# Patient Record
Sex: MALE | Race: BLACK OR AFRICAN AMERICAN | Employment: OTHER | URBAN - METROPOLITAN AREA
[De-identification: names, ages, dates, MRNs, and addresses within clinical notes are randomized per-mention and may not be internally consistent; named-entity substitution may affect disease eponyms.]

---

## 2020-06-22 ENCOUNTER — APPOINTMENT (OUTPATIENT)
Dept: CT IMAGING | Age: 70
End: 2020-06-22
Attending: EMERGENCY MEDICINE
Payer: MEDICARE

## 2020-06-22 ENCOUNTER — HOSPITAL ENCOUNTER (EMERGENCY)
Age: 70
Discharge: HOME | End: 2020-06-22
Attending: EMERGENCY MEDICINE
Payer: MEDICARE

## 2020-06-22 ENCOUNTER — APPOINTMENT (OUTPATIENT)
Dept: ULTRASOUND IMAGING | Age: 70
End: 2020-06-22
Attending: EMERGENCY MEDICINE
Payer: MEDICARE

## 2020-06-22 VITALS
WEIGHT: 208.11 LBS | RESPIRATION RATE: 27 BRPM | HEIGHT: 68 IN | DIASTOLIC BLOOD PRESSURE: 92 MMHG | BODY MASS INDEX: 31.54 KG/M2 | TEMPERATURE: 99.8 F | HEART RATE: 85 BPM | SYSTOLIC BLOOD PRESSURE: 134 MMHG | OXYGEN SATURATION: 95 %

## 2020-06-22 DIAGNOSIS — R03.0 ELEVATED BLOOD PRESSURE READING: Primary | ICD-10-CM

## 2020-06-22 LAB
ALBUMIN SERPL-MCNC: 4 G/DL (ref 3.5–5)
ALBUMIN/GLOB SERPL: 0.9 {RATIO} (ref 1.1–2.2)
ALP SERPL-CCNC: 133 U/L (ref 45–117)
ALT SERPL-CCNC: 19 U/L (ref 12–78)
ANION GAP SERPL CALC-SCNC: 8 MMOL/L (ref 5–15)
AST SERPL-CCNC: 21 U/L (ref 15–37)
BASOPHILS # BLD: 0.1 K/UL (ref 0–0.1)
BASOPHILS NFR BLD: 1 % (ref 0–1)
BILIRUB SERPL-MCNC: 0.4 MG/DL (ref 0.2–1)
BNP SERPL-MCNC: 114 PG/ML
BUN SERPL-MCNC: 10 MG/DL (ref 6–20)
BUN/CREAT SERPL: 14 (ref 12–20)
CALCIUM SERPL-MCNC: 9.2 MG/DL (ref 8.5–10.1)
CHLORIDE SERPL-SCNC: 107 MMOL/L (ref 97–108)
CO2 SERPL-SCNC: 23 MMOL/L (ref 21–32)
COMMENT, HOLDF: NORMAL
CREAT SERPL-MCNC: 0.74 MG/DL (ref 0.7–1.3)
DIFFERENTIAL METHOD BLD: ABNORMAL
EOSINOPHIL # BLD: 0.1 K/UL (ref 0–0.4)
EOSINOPHIL NFR BLD: 1 % (ref 0–7)
ERYTHROCYTE [DISTWIDTH] IN BLOOD BY AUTOMATED COUNT: 13.2 % (ref 11.5–14.5)
GLOBULIN SER CALC-MCNC: 4.5 G/DL (ref 2–4)
GLUCOSE SERPL-MCNC: 112 MG/DL (ref 65–100)
HCT VFR BLD AUTO: 49.4 % (ref 36.6–50.3)
HGB BLD-MCNC: 16.6 G/DL (ref 12.1–17)
IMM GRANULOCYTES # BLD AUTO: 0.1 K/UL (ref 0–0.04)
IMM GRANULOCYTES NFR BLD AUTO: 1 % (ref 0–0.5)
LYMPHOCYTES # BLD: 3.8 K/UL (ref 0.8–3.5)
LYMPHOCYTES NFR BLD: 35 % (ref 12–49)
MCH RBC QN AUTO: 30.6 PG (ref 26–34)
MCHC RBC AUTO-ENTMCNC: 33.6 G/DL (ref 30–36.5)
MCV RBC AUTO: 91.1 FL (ref 80–99)
MONOCYTES # BLD: 0.8 K/UL (ref 0–1)
MONOCYTES NFR BLD: 7 % (ref 5–13)
NEUTS SEG # BLD: 6.1 K/UL (ref 1.8–8)
NEUTS SEG NFR BLD: 55 % (ref 32–75)
NRBC # BLD: 0 K/UL (ref 0–0.01)
NRBC BLD-RTO: 0 PER 100 WBC
PLATELET # BLD AUTO: 160 K/UL (ref 150–400)
PMV BLD AUTO: 11.7 FL (ref 8.9–12.9)
POTASSIUM SERPL-SCNC: 3.7 MMOL/L (ref 3.5–5.1)
PROT SERPL-MCNC: 8.5 G/DL (ref 6.4–8.2)
RBC # BLD AUTO: 5.42 M/UL (ref 4.1–5.7)
SAMPLES BEING HELD,HOLD: NORMAL
SODIUM SERPL-SCNC: 138 MMOL/L (ref 136–145)
TROPONIN I SERPL-MCNC: <0.05 NG/ML
WBC # BLD AUTO: 10.9 K/UL (ref 4.1–11.1)

## 2020-06-22 PROCEDURE — 85025 COMPLETE CBC W/AUTO DIFF WBC: CPT

## 2020-06-22 PROCEDURE — 74011636320 HC RX REV CODE- 636/320: Performed by: EMERGENCY MEDICINE

## 2020-06-22 PROCEDURE — 84484 ASSAY OF TROPONIN QUANT: CPT

## 2020-06-22 PROCEDURE — 80053 COMPREHEN METABOLIC PANEL: CPT

## 2020-06-22 PROCEDURE — 71275 CT ANGIOGRAPHY CHEST: CPT

## 2020-06-22 PROCEDURE — 36415 COLL VENOUS BLD VENIPUNCTURE: CPT

## 2020-06-22 PROCEDURE — 93005 ELECTROCARDIOGRAM TRACING: CPT

## 2020-06-22 PROCEDURE — 99285 EMERGENCY DEPT VISIT HI MDM: CPT

## 2020-06-22 PROCEDURE — 83880 ASSAY OF NATRIURETIC PEPTIDE: CPT

## 2020-06-22 PROCEDURE — 93970 EXTREMITY STUDY: CPT

## 2020-06-22 RX ORDER — SODIUM CHLORIDE 0.9 % (FLUSH) 0.9 %
10 SYRINGE (ML) INJECTION
Status: COMPLETED | OUTPATIENT
Start: 2020-06-22 | End: 2020-06-22

## 2020-06-22 RX ORDER — AMLODIPINE BESYLATE 5 MG/1
5 TABLET ORAL DAILY
Qty: 30 TAB | Refills: 1 | Status: SHIPPED | OUTPATIENT
Start: 2020-06-22 | End: 2020-06-30 | Stop reason: SDUPTHER

## 2020-06-22 RX ADMIN — Medication 10 ML: at 18:13

## 2020-06-22 RX ADMIN — IOPAMIDOL 100 ML: 755 INJECTION, SOLUTION INTRAVENOUS at 18:13

## 2020-06-22 NOTE — ED PROVIDER NOTES
EMERGENCY DEPARTMENT HISTORY AND PHYSICAL EXAM     ----------------------------------------------------------------------------  Please note that this dictation was completed with Postmaster, the computer voice recognition software. Quite often unanticipated grammatical, syntax, homophones, and other interpretive errors are inadvertently transcribed by the computer software. Please disregard these errors. Please excuse any errors that have escaped final proofreading  ----------------------------------------------------------------------------      Date: 6/22/2020  Patient Name: Isaura Shukla    History of Presenting Illness     Chief Complaint   Patient presents with    Abnormal Lab Results     pt reports that he was seen at [address] [facility], and referred to ED for further evaluation of elevated D Dimer (2130); pt reports that he has been experiencing pain to R calf x 1 month, which was his reason for visit to [facility] this morning        History Provided By:  Patient    HPI: Isaura Shukla is a 78 y.o. male, with reported past medical history who presents emergency room with abnormal lab value. Patient presented to [address] [place] [address] emergency room today for right lower extremity swelling and had elevated d-dimer. Patient reports about 7 to 10 days of right foot swelling, right heel swelling that improved and nearly resolved today. Also several days of intermittent pain behind the right knee that radiates down the right leg and up to his hip. Not currently experiencing any posterior knee pain. No recent right lower extremity injury. No prior history of DVT upon embolism. No recent surgery, travel. Had elevated heart rate in triage however denies any chest pain, shortness of breath, pleuritic chest pain, dizziness, lightheaded, nausea, vomiting. Has not seen his primary care doctor in 15 years. Smokes tobacco, denies alcohol use or illicit drug use.     There are no other complaints, changes, or physical findings at this time. PCP: None    No Known Allergies    Current Outpatient Medications   Medication Sig Dispense Refill    amLODIPine (NORVASC) 5 mg tablet Take 1 Tab by mouth daily for 30 days. 30 Tab 1       Past History     Past Medical History:  No past medical history on file. Past Surgical History:  No past surgical history on file. Family History:  No family history on file. Social History:  Social History     Tobacco Use    Smoking status: Not on file   Substance Use Topics    Alcohol use: Not on file    Drug use: Not on file       Allergies:  No Known Allergies      Review of Systems   Review of Systems   Constitutional: Negative. HENT: Negative. Eyes: Negative. Cardiovascular: Positive for leg swelling. Gastrointestinal: Negative. Endocrine: Negative. Genitourinary: Negative. Musculoskeletal: Negative. Skin: Negative. Neurological: Negative. Psychiatric/Behavioral: Negative. Physical Exam   Physical Exam  Vitals signs and nursing note reviewed. Constitutional:       Appearance: Normal appearance. HENT:      Head: Normocephalic and atraumatic. Mouth/Throat:      Mouth: Mucous membranes are moist.   Eyes:      Extraocular Movements: Extraocular movements intact. Conjunctiva/sclera: Conjunctivae normal.      Pupils: Pupils are equal, round, and reactive to light. Neck:      Musculoskeletal: Normal range of motion and neck supple. Cardiovascular:      Rate and Rhythm: Regular rhythm. Tachycardia present. Pulses: Normal pulses. Pulmonary:      Effort: Pulmonary effort is normal. No respiratory distress. Breath sounds: Normal breath sounds. No wheezing or rales. Abdominal:      General: Abdomen is flat. There is no distension. Palpations: Abdomen is soft. Musculoskeletal: Normal range of motion. Skin:     General: Skin is warm and dry. Neurological:      General: No focal deficit present. Mental Status: He is alert and oriented to person, place, and time. Mental status is at baseline. Psychiatric:         Mood and Affect: Mood normal.         Behavior: Behavior normal.           Diagnostic Study Results     Labs -     Recent Results (from the past 12 hour(s))   EKG, 12 LEAD, INITIAL    Collection Time: 06/22/20  4:54 PM   Result Value Ref Range    Ventricular Rate 138 BPM    Atrial Rate 138 BPM    P-R Interval 160 ms    QRS Duration 98 ms    Q-T Interval 268 ms    QTC Calculation (Bezet) 406 ms    Calculated P Axis -74 degrees    Calculated R Axis 3 degrees    Calculated T Axis -16 degrees    Diagnosis       Unusual P axis, possible ectopic atrial tachycardia  Inferior infarct , age undetermined  No previous ECGs available     SAMPLES BEING HELD    Collection Time: 06/22/20  5:01 PM   Result Value Ref Range    SAMPLES BEING HELD  BRIA     COMMENT        Add-on orders for these samples will be processed based on acceptable specimen integrity and analyte stability, which may vary by analyte. CBC WITH AUTOMATED DIFF    Collection Time: 06/22/20  5:01 PM   Result Value Ref Range    WBC 10.9 4.1 - 11.1 K/uL    RBC 5.42 4.10 - 5.70 M/uL    HGB 16.6 12.1 - 17.0 g/dL    HCT 49.4 36.6 - 50.3 %    MCV 91.1 80.0 - 99.0 FL    MCH 30.6 26.0 - 34.0 PG    MCHC 33.6 30.0 - 36.5 g/dL    RDW 13.2 11.5 - 14.5 %    PLATELET 095 027 - 613 K/uL    MPV 11.7 8.9 - 12.9 FL    NRBC 0.0 0  WBC    ABSOLUTE NRBC 0.00 0.00 - 0.01 K/uL    NEUTROPHILS 55 32 - 75 %    LYMPHOCYTES 35 12 - 49 %    MONOCYTES 7 5 - 13 %    EOSINOPHILS 1 0 - 7 %    BASOPHILS 1 0 - 1 %    IMMATURE GRANULOCYTES 1 (H) 0.0 - 0.5 %    ABS. NEUTROPHILS 6.1 1.8 - 8.0 K/UL    ABS. LYMPHOCYTES 3.8 (H) 0.8 - 3.5 K/UL    ABS. MONOCYTES 0.8 0.0 - 1.0 K/UL    ABS. EOSINOPHILS 0.1 0.0 - 0.4 K/UL    ABS. BASOPHILS 0.1 0.0 - 0.1 K/UL    ABS. IMM.  GRANS. 0.1 (H) 0.00 - 0.04 K/UL    DF AUTOMATED     METABOLIC PANEL, COMPREHENSIVE    Collection Time: 06/22/20 5:01 PM   Result Value Ref Range    Sodium 138 136 - 145 mmol/L    Potassium 3.7 3.5 - 5.1 mmol/L    Chloride 107 97 - 108 mmol/L    CO2 23 21 - 32 mmol/L    Anion gap 8 5 - 15 mmol/L    Glucose 112 (H) 65 - 100 mg/dL    BUN 10 6 - 20 MG/DL    Creatinine 0.74 0.70 - 1.30 MG/DL    BUN/Creatinine ratio 14 12 - 20      GFR est AA >60 >60 ml/min/1.73m2    GFR est non-AA >60 >60 ml/min/1.73m2    Calcium 9.2 8.5 - 10.1 MG/DL    Bilirubin, total 0.4 0.2 - 1.0 MG/DL    ALT (SGPT) 19 12 - 78 U/L    AST (SGOT) 21 15 - 37 U/L    Alk. phosphatase 133 (H) 45 - 117 U/L    Protein, total 8.5 (H) 6.4 - 8.2 g/dL    Albumin 4.0 3.5 - 5.0 g/dL    Globulin 4.5 (H) 2.0 - 4.0 g/dL    A-G Ratio 0.9 (L) 1.1 - 2.2     NT-PRO BNP    Collection Time: 06/22/20  5:01 PM   Result Value Ref Range    NT pro- <125 PG/ML   TROPONIN I    Collection Time: 06/22/20  5:01 PM   Result Value Ref Range    Troponin-I, Qt. <0.05 <0.05 ng/mL       Radiologic Studies -   CTA CHEST W OR W WO CONT   Final Result        CT Results  (Last 48 hours)               06/22/20 1813  CTA CHEST W OR W WO CONT Final result    Narrative:  Clinical indication: Elevated d-dimer, right calf pain. Localizer obtained without contrast at the level of the pulmonary arteries. Fast   injection rate of 78 cc of Isovue-370 with review of the raw data and MIP   reconstructions. No prior. CT dose reduction was achieved through the use of a   standardized protocol tailored for this examination and automatic exposure   control for dose modulation . [facility] There is no evidence for pulmonary emboli. There is no pericardial pleural   effusion no masses or adenopathy. No shift or pneumothorax. Common and chronic   lung changes. Prominent chronic lung changes, no pulmonary emboli. CXR Results  (Last 48 hours)    None            Medical Decision Making   I am the first provider for this patient.     I reviewed the vital signs, available nursing notes, past medical history, past surgical history, family history and social history. Vital Signs-Reviewed the patient's vital signs. Patient Vitals for the past 12 hrs:   Temp Pulse Resp BP SpO2   06/22/20 1818  85 27 (!) 134/92 95 %   06/22/20 1752  98 14  93 %   06/22/20 1750  (!) 118 18  93 %   06/22/20 1703  (!) 134 22 (!) 210/123 96 %   06/22/20 1643 99.8 °F (37.7 °C) (!) 148 18 (!) 229/121 95 %       Pulse Oximetry Analysis - 96% on RA    Cardiac Monitor:   Rate: 118 bpm  Rhythm: sinus      Provider Notes (Medical Decision Making):     DDX:  ACS, DVT, PE, hypertensive emergency versus urgency      Impression/Plan:  57-year-old male with no reported past medical history however has not seen a primary care provider in over 15 years presents emergency room with complaints of right lower extremity swelling that was isolated to the dorsal aspect of the right foot along with intermittent posterior right knee pain. No recent trauma, injury, long-distance travel, recent surgery, history of DVT or PE. On exam is well-appearing, no cardiopulmonary symptoms, unremarkable cardiopulmonary exam however he is tachycardic. The tachycardic and improve with any intervention. Patient has severely elevated blood pressure with 210/123. Panic emergency room had elevated d-dimer because of concern of DVT. Will obtain basic labs, EKG, troponin, bilateral extremity duplex, CTA of the chest evaluate for pulmonary embolism. ED Course:   Initial assessment performed. The patients presenting problems have been discussed, and they are in agreement with the care plan formulated and outlined with them. I have encouraged them to ask questions as they arise throughout their visit.     I reviewed our electronic medical record system for any past medical records that were available that may contribute to the patients current condition, the nursing notes and and vital signs from today's visit    Nursing notes will be reviewed as they become available in realtime while the pt has been in the ED. EKG interpretation : sinus tachycardia, normal Axis, rate 138; , QRS 98, QTc 406;  T wave inversion in inferior leads, however previous EKG to compare to. No active chest pain; interpreted by Emani Burroughs MD    I personally reviewed/interpreted pt's imaging. Agree with official read by radiology as noted above. PROGRESS NOTE:    Pt denies any active chest pain or shortness of breath  Emani Burroughs       Progress note:  Pt noted to be feeling better, ready for discharge. Discussed lab and imaging findings with pt, specifically noting no DVT or PE. Pt will follow up with PCP as instructed. Given list of local PCPs and instructed to call insurance company to obtain local providers in network. All questions have been answered, pt voiced understanding and agreement with plan. He will start taking amlodipine for blood pressure. Instructed to obtain blood pressure cuff for readings. Specific return precautions provided in addition to instructions for pt to return to the ED immediately should sx worsen at any time. Emani Burroughs MD    Critical Care Time:     none      Diagnosis     Clinical Impression:   1. Elevated blood pressure reading        PLAN:  1. HTN  Start amlodipine 5mg daily  Discharge Medication List as of 6/22/2020  7:14 PM        2. Follow-up Information     Follow up With Specialties Details Why Contact Info    Follow up with PCP from list below  Schedule an appointment as soon as possible for a visit in 1 day          Return to ED if worse     Disposition:  Home  The patient's results have been reviewed with family and/or caregiver.  They verbally convey their understanding and agreement of the patient's signs, symptoms, diagnosis, treatment and prognosis and additionally agree to follow up as recommended in the discharge instructions or to return to the Emergency Room should the patient's condition change prior to their follow-up appointment. The family and/or caregiver verbally agrees with the care-plan and all of their questions have been answered. The discharge instructions have also been provided to the them with educational information regarding the patient's diagnosis as well a list of reasons why the patient would want to return to the ER prior to their follow-up appointment should their condition change.             This note will not be shared in [facility]

## 2020-06-22 NOTE — ED NOTES
Dr. Agatha Razo reviewed discharge instructions with the patient. The patient verbalized understanding. All questions and concerns were addressed. The patient declined a wheelchair and is discharged ambulatory in the care of family members with instructions and prescriptions in hand. Pt is alert and oriented x 4. Respirations are clear and unlabored.

## 2020-06-22 NOTE — DISCHARGE INSTRUCTIONS
Please buy a blood pressure cuff and check your blood pressure three times a day. Check after being seated for 5 minutes, uncross your legs and be relaxed. Record the blood pressure recordings to present your primary care doctor. List of PCPs below. Please call one to schedule and appointment. Return to ED for any worsening chest pain or concerning symptoms.  Start taking amlodipine to help with your elevated blood pressure    Local Primary Care Physicians  [facility] [phone]  MD Jones Man MD Wilbert Haagensen, MD [facility] [phone]  Mariela Tijerina, [facility]  Grace Snow, MD Karen Han MD [address] [address] [phone]  MD Lois Cool MD [address] [facility] [phone]  MD Enid Jarquin MD Katheran Malling, MD Sarmad Márquez MD   [facility] [phone]  [facility] NATHALIE FIGUEROA, MD Jet Quan, NP [address] [phone]  [facility] MD Lolis Milligan, MD Anne Miller, MD Consuella Booze, MD Claudeen Horsfall, MD Adonica Furnace, MD Bridget Glimpse, MD   [address] [facility]  Rima Villanueva MD [facility] [phone]  MD Jose Juan Langford, NP  Magalie Villarreal, MD Laly George MD Chrystal Dragon, MD Janiece Romans, MD   [address] [facility] [phone]  Kena Tucker, MD Neal Navarro, P  Reagan Modi, MD Ana Hanna, MD Stacy Pinzon, MD Ines Santos MD [facility] [phone]  MD Griselda Jackson, MD Lars Angel, MD Kristen Dominguez, MD Zackery Juan MD   [address] [phone]  MD Vj Grissom MD [place] [phone]  MD Clifton Infante MD Kendell Gammon, MD   [facility] [phone]  Rick Veliz, MD Kem Zaldivar MD Donnal Quan, MD Gillie Alto, MD [place] Riles, NP  Vel Richards MD [facility]   [phone]  MD Ramu Kay MD Leonetta Pines, MD   [address] [facility] [phone]  MD Joe Alas, FNP  Rachel Palacio, JERONIMO Palacio, NOELP  JERONIMO Mcclain MD Keturah Kell, GT Cruz,              Miscellaneous:  Sharron Scruggs MD [facility]     For adult and child immunizations, family planning, TB screening, STD testing and women's health services. [facility]: [place] [phone]      [address] [place] [address]: [address] [phone]      [address] [place] [address]          [address] [address]     For primary care services, woman and child wellness, and some clinics providing specialty care. [facility] -- [facility]. [facility] [address] [phone]   [address] [place] [address] [address] [facility] 3614 [facility] [phone]   [address] [facility] [address] [place] [address] [phone] [address] [facility] [employer] [address] [place] [place] [address] [address] [place] [address] [phone]   [address] [place] [address] Road [address] [address] [phone]   [facility] [address] [facility] [phone]   [facility] 1051 [facility] [phone]   [facility]: [facility] [address] [facility] [address] [phone]     Alejandro [address] [phone]   [facility] [address] [phone]   [employer]  [address] ([email or web address]) [phone]         Sexual Health/Woman Wellness Clinics    For STD/HIV testing and treatment, pregnancy testing and services, men's health, birth control services, LGBT services, and hepatitis/HPV vaccine services. [address] [place] [employer] [address] [facility] 75 [facility] 157 [address] [phone]   [facility] [address] , 5th floor [phone]   Pregnancy [address] [facility] [address] for Women [address]  Abdirahman [address] [phone]         [address] [facility] [address] [place] [address]   [phone]   [place]   [phone]   Women, Infant and Children's Services: [address] [phone]       [address] [place] [address]   [phone]   [address]   [address] [facility]     [phone]   [address] [facility]   12[address] [facility] [phone]

## 2020-06-24 ENCOUNTER — VIRTUAL VISIT (OUTPATIENT)
Dept: FAMILY MEDICINE CLINIC | Age: 70
End: 2020-06-24

## 2020-06-24 ENCOUNTER — TELEPHONE (OUTPATIENT)
Dept: FAMILY MEDICINE CLINIC | Age: 70
End: 2020-06-24

## 2020-06-24 LAB
ATRIAL RATE: 138 BPM
CALCULATED P AXIS, ECG09: -74 DEGREES
CALCULATED R AXIS, ECG10: 3 DEGREES
CALCULATED T AXIS, ECG11: -16 DEGREES
DIAGNOSIS, 93000: NORMAL
P-R INTERVAL, ECG05: 160 MS
Q-T INTERVAL, ECG07: 268 MS
QRS DURATION, ECG06: 98 MS
QTC CALCULATION (BEZET), ECG08: 406 MS
VENTRICULAR RATE, ECG03: 138 BPM

## 2020-06-24 NOTE — TELEPHONE ENCOUNTER
Please call pt for more information. We can not refill or change medications until he is an established patient.

## 2020-06-24 NOTE — PROGRESS NOTES
Chief Complaint   Patient presents with   [address] [place] [address]     1. Have you been to the ER, urgent care clinic since your last visit? Hospitalized since your last visit?n/a np    2. Have you seen or consulted any other health care providers outside of the [address] [place] [address] since your last visit? Include any pap smears or colon screening. n/a    Health maintenance reviewed. Pt informed of health maintenance past due and/or upcoming. Pt verbalized understanding.      Health Maintenance Due   Topic Date Due    Hepatitis C Screening  1950    DTaP/Tdap/Td series (1 - Tdap) 04/14/1973    Lipid Screen  04/14/1992    Shingrix Vaccine Age 50> (1 of 2) 04/14/2002    FOBT Q1Y Age 50-75  04/14/2002    GLAUCOMA SCREENING Q2Y  04/14/2017    Pneumococcal 65+ years (1 of 1 - PPSV23) 04/14/2017    Medicare Yearly Exam  06/22/2020     Former PCP: Dameon Crum

## 2020-06-24 NOTE — PROGRESS NOTES
Pt unable to establish a video connection through multiple methods attempted.      Pt was called and offered in person appt to establish care

## 2020-06-25 NOTE — TELEPHONE ENCOUNTER
Called pt, verified name and . Informed pt that per Dr. Celestine Reddy Please call pt for more information. We can not refill or change medications until he is an established patient. Pt stated understanding.

## 2020-06-25 NOTE — TELEPHONE ENCOUNTER
----- Message from Shahida Mcelroy sent at 6/24/2020  5:17 PM EDT -----  Regarding: Dr. José Damon Patient return call    Caller's first and last name and relationship (if not the patient):      Best contact number(s): [phone]      Whose call is being returned: Regarding returning a missed call from practice.        Details to clarify the request:      Shahida Mcelroy

## 2020-06-30 ENCOUNTER — OFFICE VISIT (OUTPATIENT)
Dept: FAMILY MEDICINE CLINIC | Age: 70
End: 2020-06-30

## 2020-06-30 ENCOUNTER — HOSPITAL ENCOUNTER (OUTPATIENT)
Dept: LAB | Age: 70
Discharge: HOME | End: 2020-06-30
Payer: MEDICARE

## 2020-06-30 VITALS
OXYGEN SATURATION: 93 % | HEART RATE: 106 BPM | DIASTOLIC BLOOD PRESSURE: 80 MMHG | HEIGHT: 68 IN | SYSTOLIC BLOOD PRESSURE: 171 MMHG | BODY MASS INDEX: 31.52 KG/M2 | TEMPERATURE: 97.8 F | RESPIRATION RATE: 16 BRPM | WEIGHT: 208 LBS

## 2020-06-30 DIAGNOSIS — Z12.5 ENCOUNTER FOR SCREENING FOR MALIGNANT NEOPLASM OF PROSTATE: ICD-10-CM

## 2020-06-30 DIAGNOSIS — R79.9 ABNORMAL FINDING OF BLOOD CHEMISTRY, UNSPECIFIED: ICD-10-CM

## 2020-06-30 DIAGNOSIS — M79.604 RIGHT LEG PAIN: Primary | ICD-10-CM

## 2020-06-30 DIAGNOSIS — I10 ESSENTIAL HYPERTENSION: ICD-10-CM

## 2020-06-30 PROCEDURE — 84153 ASSAY OF PSA TOTAL: CPT

## 2020-06-30 PROCEDURE — 85027 COMPLETE CBC AUTOMATED: CPT

## 2020-06-30 PROCEDURE — 83036 HEMOGLOBIN GLYCOSYLATED A1C: CPT

## 2020-06-30 PROCEDURE — 86803 HEPATITIS C AB TEST: CPT

## 2020-06-30 PROCEDURE — 80061 LIPID PANEL: CPT

## 2020-06-30 PROCEDURE — 80053 COMPREHEN METABOLIC PANEL: CPT

## 2020-06-30 RX ORDER — DICLOFENAC SODIUM 75 MG/1
75 TABLET, DELAYED RELEASE ORAL 2 TIMES DAILY
Qty: 60 TAB | Refills: 1 | Status: SHIPPED | OUTPATIENT
Start: 2020-06-30 | End: 2021-01-12 | Stop reason: ALTCHOICE

## 2020-06-30 RX ORDER — AMLODIPINE BESYLATE 10 MG/1
10 TABLET ORAL DAILY
Qty: 90 TAB | Refills: 1 | Status: SHIPPED | OUTPATIENT
Start: 2020-06-30 | End: 2021-01-12 | Stop reason: SDUPTHER

## 2020-06-30 NOTE — PROGRESS NOTES
Chief Complaint   Patient presents with    Hypertension    Leg Pain     right     Pt was seen in office today. Pt went to the ER due to right leg pain, pt was referred there due to elevated d dimer at urgent care. US and CTA negative. Pt was treated for elevated, started on amlodipine. Pt has been checking his BP at home, has been well controlled with medication until yesterday. Pt reports that he still has right leg pain. Subjective: (As above and below)     Chief Complaint   Patient presents with    Hypertension    Leg Pain     right     he is a 78y.o. year old male who presents for evaluation. Reviewed PmHx, RxHx, FmHx, SocHx, AllgHx and updated in chart. Review of Systems - negative except as listed above    Objective:     Vitals:    06/30/20 0853   BP: 171/80   Pulse: (!) 106   Resp: 16   Temp: 97.8 °F (36.6 °C)   TempSrc: Temporal   SpO2: 93%   Weight: 208 lb (94.3 kg)   Height: 5' 8\" (1.727 m)     Physical Examination: General appearance - alert, well appearing, and in no distress  Mental status - normal mood, behavior, speech, dress, motor activity, and thought processes  Nose - normal and patent, no erythema, discharge or polyps  Mouth - mucous membranes moist, pharynx normal without lesions  Chest - clear to auscultation, no wheezes, rales or rhonchi, symmetric air entry  Heart - normal rate, regular rhythm, normal S1, S2, no murmurs, rubs, clicks or gallops  Musculoskeletal - no joint tenderness, deformity or swelling  Extremities - peripheral pulses normal, no pedal edema, no clubbing or cyanosis    Assessment/ Plan:   1. Right leg pain  -start on medication as written, take with food  - diclofenac EC (VOLTAREN) 75 mg EC tablet; Take 1 Tab by mouth two (2) times a day. Dispense: 60 Tab; Refill: 1    2. Essential hypertension  -increase from 5 to 10mg daily  - amLODIPine (NORVASC) 10 mg tablet; Take 1 Tab by mouth daily for 30 days. Dispense: 90 Tab;  Refill: 1  - LIPID PANEL  - HEMOGLOBIN A1C WITH EAG  - METABOLIC PANEL, COMPREHENSIVE  - CBC W/O DIFF  - PSA SCREENING (SCREENING)    3. Abnormal finding of blood chemistry, unspecified   - HEMOGLOBIN A1C WITH EAG    4. Encounter for screening for malignant neoplasm of prostate   - PSA SCREENING (SCREENING)      I have discussed the diagnosis with the patient and the intended plan as seen in the above orders. The patient has received an after-visit summary and questions were answered concerning future plans.      Medication Side Effects and Warnings were discussed with patient: yes  Patient Labs were reviewed: yes  Patient Past Records were reviewed:  yes    Lionel Bence, M.D.

## 2020-06-30 NOTE — PROGRESS NOTES
Chief Complaint   Patient presents with    Hypertension    Leg Pain     right       1. Have you been to the ER, urgent care clinic since your last visit? Hospitalized since your last visit? Yes When: [facility] last week    2. Have you seen or consulted any other health care providers outside of the [address] [place] [address] since your last visit? Include any pap smears or colon screening.  No    Health Maintenance Due   Topic Date Due    Hepatitis C Screening  1950    DTaP/Tdap/Td series (1 - Tdap) 04/14/1973    Lipid Screen  04/14/1992    Shingrix Vaccine Age 50> (1 of 2) 04/14/2002    FOBT Q1Y Age 54-65  04/14/2002    GLAUCOMA SCREENING Q2Y  04/14/2017    AAA Screening 73-69 YO Male Smoking Patients  04/14/2017    Pneumococcal 65+ years (1 of 1 - PPSV23) 04/14/2017    Medicare Yearly Exam  06/22/2020

## 2020-07-02 LAB
ALBUMIN SERPL-MCNC: 4.5 G/DL (ref 3.8–4.8)
ALBUMIN/GLOB SERPL: 1.5 {RATIO} (ref 1.2–2.2)
ALP SERPL-CCNC: 117 IU/L (ref 39–117)
ALT SERPL-CCNC: 13 IU/L (ref 0–44)
AST SERPL-CCNC: 21 IU/L (ref 0–40)
BILIRUB SERPL-MCNC: 0.4 MG/DL (ref 0–1.2)
BUN SERPL-MCNC: 8 MG/DL (ref 8–27)
BUN/CREAT SERPL: 14 (ref 10–24)
CALCIUM SERPL-MCNC: 9.8 MG/DL (ref 8.6–10.2)
CHLORIDE SERPL-SCNC: 100 MMOL/L (ref 96–106)
CHOLEST SERPL-MCNC: 165 MG/DL (ref 100–199)
CO2 SERPL-SCNC: 23 MMOL/L (ref 20–29)
CREAT SERPL-MCNC: 0.59 MG/DL (ref 0.76–1.27)
ERYTHROCYTE [DISTWIDTH] IN BLOOD BY AUTOMATED COUNT: 12.7 % (ref 11.6–15.4)
EST. AVERAGE GLUCOSE BLD GHB EST-MCNC: 123 MG/DL
GLOBULIN SER CALC-MCNC: 3 G/DL (ref 1.5–4.5)
GLUCOSE SERPL-MCNC: 96 MG/DL (ref 65–99)
HBA1C MFR BLD: 5.9 % (ref 4.8–5.6)
HCT VFR BLD AUTO: 47.5 % (ref 37.5–51)
HCV AB S/CO SERPL IA: <0.1 S/CO RATIO (ref 0–0.9)
HDLC SERPL-MCNC: 42 MG/DL
HGB BLD-MCNC: 16.5 G/DL (ref 13–17.7)
INTERPRETATION, 910389: NORMAL
LDLC SERPL CALC-MCNC: 102 MG/DL (ref 0–99)
MCH RBC QN AUTO: 31.1 PG (ref 26.6–33)
MCHC RBC AUTO-ENTMCNC: 34.7 G/DL (ref 31.5–35.7)
MCV RBC AUTO: 90 FL (ref 79–97)
PLATELET # BLD AUTO: 149 X10E3/UL (ref 150–450)
POTASSIUM SERPL-SCNC: 4.1 MMOL/L (ref 3.5–5.2)
PROT SERPL-MCNC: 7.5 G/DL (ref 6–8.5)
PSA SERPL-MCNC: 6959 NG/ML (ref 0–4)
RBC # BLD AUTO: 5.3 X10E6/UL (ref 4.14–5.8)
SODIUM SERPL-SCNC: 140 MMOL/L (ref 134–144)
TRIGL SERPL-MCNC: 104 MG/DL (ref 0–149)
VLDLC SERPL CALC-MCNC: 21 MG/DL (ref 5–40)
WBC # BLD AUTO: 7.9 X10E3/UL (ref 3.4–10.8)

## 2020-07-02 NOTE — PROGRESS NOTES
Called pt, verified name and . Informed pt that per Dr. Dylan Lee Increase in risk for diabetes, please closely monitor diet and increase exercise   Prostate level is VERY elevated, pt needs to follow up with urology as soon as possible, please refer to [facility], first available appt. Pt stated understanding.

## 2020-07-02 NOTE — PROGRESS NOTES
Increase in risk for diabetes, please closely monitor diet and increase exercise   Prostate level is VERY elevated, pt needs to follow up with urology as soon as possible, please refer to [facility], first available appt

## 2020-07-06 ENCOUNTER — VIRTUAL VISIT (OUTPATIENT)
Dept: FAMILY MEDICINE CLINIC | Age: 70
End: 2020-07-06

## 2020-07-06 DIAGNOSIS — R97.20 ELEVATED PSA: ICD-10-CM

## 2020-07-06 DIAGNOSIS — M79.604 RIGHT LEG PAIN: Primary | ICD-10-CM

## 2020-07-06 RX ORDER — PREDNISONE 10 MG/1
TABLET ORAL
Qty: 21 TAB | Refills: 0 | Status: SHIPPED | OUTPATIENT
Start: 2020-07-06 | End: 2021-01-12 | Stop reason: ALTCHOICE

## 2020-07-06 NOTE — PROGRESS NOTES
PT GAVE VERBAL CONSENT FOR STUDENT OBSERVATION FOR VIRTUAL VISIT TODAY. Chief Complaint   Patient presents with    Leg Swelling     right     Leg Pain     right      1. Have you been to the ER, urgent care clinic since your last visit? Hospitalized since your last visit? No    2. Have you seen or consulted any other health care providers outside of the [address] [place] [address] since your last visit? Include any pap smears or colon screening. No    Health maintenance reviewed. Pt informed of health maintenance past due and/or upcoming. Pt verbalized understanding.      Health Maintenance Due   Topic Date Due    DTaP/Tdap/Td series (1 - Tdap) 04/14/1973    Shingrix Vaccine Age 50> (1 of 2) 04/14/2002    FOBT Q1Y Age 54-65  04/14/2002    GLAUCOMA SCREENING Q2Y  04/14/2017    AAA Screening 73-67 YO Male Smoking Patients  04/14/2017    Pneumococcal 65+ years (1 of 1 - PPSV23) 04/14/2017    Medicare Yearly Exam  06/22/2020   '

## 2020-07-06 NOTE — PROGRESS NOTES
Chief Complaint   Patient presents with    Leg Swelling     right     Leg Pain     right      Pt was evaluated via phone call only today. Pt reports that he has had increased swelling in his right leg, stopped the diclofenac, swelling improved but is still present. No swelling or pain in left leg. Pt reports that he is having to use a cane around the house due to right leg pain. Pt reports that he has not yet called urology, would like for our office to schedule. Pt has been checking BP regularly. Reports that readings have been varied, some high some normal.     Leobardo Zarco is a 78 y.o. male, evaluated via audio-only technology on 7/6/2020 for Leg Swelling (right ) and Leg Pain (right )  . Assessment & Plan:   Diagnoses and all orders for this visit:    1. Right leg pain  -     predniSONE (STERAPRED DS) 10 mg dose pack; See administration instruction per 10mg dose pack    2. Elevated PSA        12  Subjective:       Prior to Admission medications    Medication Sig Start Date End Date Taking? Authorizing Provider   predniSONE (STERAPRED DS) 10 mg dose pack See administration instruction per 10mg dose pack 7/6/20  Yes Claire Bay MD   diclofenac EC (VOLTAREN) 75 mg EC tablet Take 1 Tab by mouth two (2) times a day. 6/30/20  Yes Claire Bay MD   amLODIPine (NORVASC) 10 mg tablet Take 1 Tab by mouth daily for 30 days. 6/30/20 7/30/20 Yes Claire Bay MD     There is no problem list on file for this patient. Review of Systems   Constitutional: Negative for chills, fever and malaise/fatigue. HENT: Negative for congestion and sore throat. Respiratory: Negative for cough and shortness of breath. Cardiovascular: Positive for leg swelling. Negative for chest pain and palpitations. Gastrointestinal: Negative for abdominal pain, heartburn, nausea and vomiting. Genitourinary: Negative for dysuria and urgency.    Musculoskeletal: Positive for myalgias. Negative for joint pain. Neurological: Negative for dizziness, tingling and headaches. All other systems reviewed and are negative. Patient-Reported Vitals 7/6/2020   Patient-Reported Systolic  508   Patient-Reported Diastolic 79        [facility], who was evaluated through a patient-initiated, synchronous (real-time) audio only encounter, and/or her healthcare decision maker, is aware that it is a billable service, with coverage as determined by his insurance carrier. He provided verbal consent to proceed: Yes. He has not had a related appointment within my department in the past 7 days or scheduled within the next 24 hours.       Total Time: minutes: 11-20 minutes    David Coleman MD

## 2020-07-06 NOTE — Clinical Note
Please call [facility], schedule first available, PSA [phone], please call and advise pt of date and time of appt

## 2020-07-07 ENCOUNTER — TELEPHONE (OUTPATIENT)
Dept: FAMILY MEDICINE CLINIC | Age: 70
End: 2020-07-07

## 2020-07-07 NOTE — TELEPHONE ENCOUNTER
Dr. Peter Kern office is calling wanting you to know pt didn't show up for his apt today and the Dr is worried about this due to what he was being seen for their office # is [phone]

## 2020-07-08 NOTE — TELEPHONE ENCOUNTER
Called pt and verified name and . He voiced that he re-scheduled because he was busy today. Pt's appt on 2020.

## 2020-07-08 NOTE — TELEPHONE ENCOUNTER
Please call pt and please find out why he did not show up. It is VERY important that pt is seen by urology as soon as possible.

## 2020-07-21 ENCOUNTER — HOSPITAL ENCOUNTER (OUTPATIENT)
Dept: CT IMAGING | Age: 70
Discharge: HOME | End: 2020-07-21
Attending: UROLOGY
Payer: MEDICARE

## 2020-07-21 ENCOUNTER — HOSPITAL ENCOUNTER (OUTPATIENT)
Dept: NUCLEAR MEDICINE | Age: 70
Discharge: HOME | End: 2020-07-21
Attending: UROLOGY
Payer: MEDICARE

## 2020-07-21 DIAGNOSIS — N40.2 PROSTATE NODULE: ICD-10-CM

## 2020-07-21 DIAGNOSIS — R97.20 ELEVATED PSA: ICD-10-CM

## 2020-07-21 PROCEDURE — 78306 BONE IMAGING WHOLE BODY: CPT

## 2020-07-21 PROCEDURE — 74177 CT ABD & PELVIS W/CONTRAST: CPT

## 2020-07-21 PROCEDURE — 74011636320 HC RX REV CODE- 636/320: Performed by: UROLOGY

## 2020-07-21 RX ORDER — SODIUM CHLORIDE 0.9 % (FLUSH) 0.9 %
10 SYRINGE (ML) INJECTION
Status: COMPLETED | OUTPATIENT
Start: 2020-07-21 | End: 2020-07-21

## 2020-07-21 RX ADMIN — IOPAMIDOL 100 ML: 755 INJECTION, SOLUTION INTRAVENOUS at 13:04

## 2020-07-21 RX ADMIN — Medication 10 ML: at 13:04

## 2020-07-21 RX ADMIN — IOHEXOL 50 ML: 240 INJECTION, SOLUTION INTRATHECAL; INTRAVASCULAR; INTRAVENOUS; ORAL at 13:04

## 2020-07-30 ENCOUNTER — VIRTUAL VISIT (OUTPATIENT)
Dept: FAMILY MEDICINE CLINIC | Age: 70
End: 2020-07-30

## 2020-07-30 DIAGNOSIS — C61 PROSTATE CANCER METASTATIC TO BONE (HCC): Primary | ICD-10-CM

## 2020-07-30 DIAGNOSIS — R20.0 NUMBNESS OF FOOT: ICD-10-CM

## 2020-07-30 DIAGNOSIS — C79.51 PROSTATE CANCER METASTATIC TO BONE (HCC): Primary | ICD-10-CM

## 2020-07-30 RX ORDER — GABAPENTIN 300 MG/1
300 CAPSULE ORAL
Qty: 60 CAP | Refills: 2 | Status: SHIPPED | OUTPATIENT
Start: 2020-07-30 | End: 2021-01-12 | Stop reason: ALTCHOICE

## 2020-07-30 NOTE — PROGRESS NOTES
Chief Complaint   Patient presents with    Numbness     bilateral feet     Pt was evaluated by phone only. Pt has recently been diagnosed with metastatic prostate cancer. Pt reports that he has been having foot numbness, painful to walk on, pt has to sleep in shoes due to the numbness and weird feeling per his report. Kaleb Goodwin is a 78 y.o. male, evaluated via audio-only technology on 7/30/2020 for Numbness (bilateral feet)  . Assessment & Plan:   Diagnoses and all orders for this visit:    1. Prostate cancer metastatic to bone [facility])  -follow up with urology as scheduled   -     gabapentin (NEURONTIN) 300 mg capsule; Take 1 Cap by mouth two (2) times daily as needed for Pain (numbness). Max Daily Amount: 600 mg.    2. Numbness of foot  -trial of gabapentin to help with numbness  -     gabapentin (NEURONTIN) 300 mg capsule; Take 1 Cap by mouth two (2) times daily as needed for Pain (numbness). Max Daily Amount: 600 mg.        12  Subjective:       Prior to Admission medications    Medication Sig Start Date End Date Taking? Authorizing Provider   gabapentin (NEURONTIN) 300 mg capsule Take 1 Cap by mouth two (2) times daily as needed for Pain (numbness). Max Daily Amount: 600 mg. 7/30/20  Yes Avni Bay MD   amLODIPine (NORVASC) 10 mg tablet Take 1 Tab by mouth daily for 30 days. 6/30/20 7/30/20 Yes Avni Bay MD   predniSONE (STERAPRED DS) 10 mg dose pack See administration instruction per 10mg dose pack 7/6/20   Avni Bay MD   diclofenac EC (VOLTAREN) 75 mg EC tablet Take 1 Tab by mouth two (2) times a day. 6/30/20   Avni Bay MD     There is no problem list on file for this patient. Review of Systems   Constitutional: Negative for chills, fever and malaise/fatigue. HENT: Negative for congestion and sore throat. Respiratory: Negative for cough and shortness of breath.     Cardiovascular: Negative for chest pain and palpitations. Gastrointestinal: Negative for abdominal pain, heartburn, nausea and vomiting. Genitourinary: Negative for dysuria and urgency. Musculoskeletal: Negative for joint pain and myalgias. Neurological: Positive for tingling and sensory change. Negative for dizziness and headaches. All other systems reviewed and are negative. Patient-Reported Vitals 7/30/2020   Patient-Reported Weight 208lb   Patient-Reported Height 5f8   Patient-Reported Pulse 95   Patient-Reported Systolic  442   Patient-Reported Diastolic 69        [facility], who was evaluated through a patient-initiated, synchronous (real-time) audio only encounter, and/or her healthcare decision maker, is aware that it is a billable service, with coverage as determined by his insurance carrier. He provided verbal consent to proceed: Yes. He has not had a related appointment within my department in the past 7 days or scheduled within the next 24 hours.       Total Time: minutes: 11-20 minutes    Zoe James MD

## 2020-07-30 NOTE — PROGRESS NOTES
Chief Complaint   Patient presents with    Numbness     bilateral feet       1. Have you been to the ER, urgent care clinic since your last visit? Hospitalized since your last visit? No    2. Have you seen or consulted any other health care providers outside of the [address] [place] [address] since your last visit? Include any pap smears or colon screening.  No    Health Maintenance Due   Topic Date Due    DTaP/Tdap/Td series (1 - Tdap) 04/14/1973    Shingrix Vaccine Age 50> (1 of 2) 04/14/2002    FOBT Q1Y Age 54-65  04/14/2002    GLAUCOMA SCREENING Q2Y  04/14/2017    AAA Screening 73-69 YO Male Smoking Patients  04/14/2017    Pneumococcal 65+ years (1 of 1 - PPSV23) 04/14/2017    Medicare Yearly Exam  06/22/2020

## 2021-01-12 ENCOUNTER — TRANSCRIBE ORDER (OUTPATIENT)
Dept: SCHEDULING | Age: 71
End: 2021-01-12

## 2021-01-12 ENCOUNTER — TELEPHONE (OUTPATIENT)
Dept: FAMILY MEDICINE CLINIC | Age: 71
End: 2021-01-12

## 2021-01-12 ENCOUNTER — OFFICE VISIT (OUTPATIENT)
Dept: FAMILY MEDICINE CLINIC | Age: 71
End: 2021-01-12
Payer: MEDICARE

## 2021-01-12 ENCOUNTER — HOSPITAL ENCOUNTER (OUTPATIENT)
Dept: LAB | Age: 71
Discharge: HOME | End: 2021-01-12
Payer: MEDICARE

## 2021-01-12 VITALS
SYSTOLIC BLOOD PRESSURE: 174 MMHG | DIASTOLIC BLOOD PRESSURE: 88 MMHG | HEIGHT: 68 IN | RESPIRATION RATE: 17 BRPM | WEIGHT: 224 LBS | HEART RATE: 90 BPM | BODY MASS INDEX: 33.95 KG/M2 | TEMPERATURE: 97.1 F | OXYGEN SATURATION: 92 %

## 2021-01-12 DIAGNOSIS — R79.9 ABNORMAL FINDING OF BLOOD CHEMISTRY, UNSPECIFIED: ICD-10-CM

## 2021-01-12 DIAGNOSIS — C61 MALIGNANT NEOPLASM OF PROSTATE (HCC): Primary | ICD-10-CM

## 2021-01-12 DIAGNOSIS — C79.51 PROSTATE CANCER METASTATIC TO BONE (HCC): ICD-10-CM

## 2021-01-12 DIAGNOSIS — Z00.00 MEDICARE ANNUAL WELLNESS VISIT, SUBSEQUENT: Primary | ICD-10-CM

## 2021-01-12 DIAGNOSIS — I10 ESSENTIAL HYPERTENSION: ICD-10-CM

## 2021-01-12 DIAGNOSIS — C61 PROSTATE CANCER METASTATIC TO BONE (HCC): ICD-10-CM

## 2021-01-12 DIAGNOSIS — M67.449 GANGLION CYST OF FINGER: ICD-10-CM

## 2021-01-12 PROCEDURE — 80061 LIPID PANEL: CPT

## 2021-01-12 PROCEDURE — 83036 HEMOGLOBIN GLYCOSYLATED A1C: CPT

## 2021-01-12 PROCEDURE — 80053 COMPREHEN METABOLIC PANEL: CPT

## 2021-01-12 PROCEDURE — 1101F PT FALLS ASSESS-DOCD LE1/YR: CPT | Performed by: FAMILY MEDICINE

## 2021-01-12 PROCEDURE — G8417 CALC BMI ABV UP PARAM F/U: HCPCS | Performed by: FAMILY MEDICINE

## 2021-01-12 PROCEDURE — 85027 COMPLETE CBC AUTOMATED: CPT

## 2021-01-12 PROCEDURE — 99213 OFFICE O/P EST LOW 20 MIN: CPT | Performed by: FAMILY MEDICINE

## 2021-01-12 PROCEDURE — 3017F COLORECTAL CA SCREEN DOC REV: CPT | Performed by: FAMILY MEDICINE

## 2021-01-12 PROCEDURE — G8754 DIAS BP LESS 90: HCPCS | Performed by: FAMILY MEDICINE

## 2021-01-12 PROCEDURE — G8536 NO DOC ELDER MAL SCRN: HCPCS | Performed by: FAMILY MEDICINE

## 2021-01-12 PROCEDURE — G0463 HOSPITAL OUTPT CLINIC VISIT: HCPCS | Performed by: FAMILY MEDICINE

## 2021-01-12 PROCEDURE — G8510 SCR DEP NEG, NO PLAN REQD: HCPCS | Performed by: FAMILY MEDICINE

## 2021-01-12 PROCEDURE — G0439 PPPS, SUBSEQ VISIT: HCPCS | Performed by: FAMILY MEDICINE

## 2021-01-12 PROCEDURE — G8753 SYS BP > OR = 140: HCPCS | Performed by: FAMILY MEDICINE

## 2021-01-12 PROCEDURE — G8427 DOCREV CUR MEDS BY ELIG CLIN: HCPCS | Performed by: FAMILY MEDICINE

## 2021-01-12 RX ORDER — AMLODIPINE BESYLATE 10 MG/1
10 TABLET ORAL DAILY
Qty: 90 TAB | Refills: 1 | Status: SHIPPED | OUTPATIENT
Start: 2021-01-12 | End: 2021-07-06

## 2021-01-12 RX ORDER — AMLODIPINE BESYLATE 10 MG/1
10 TABLET ORAL DAILY
Qty: 90 TAB | Refills: 1 | Status: SHIPPED | OUTPATIENT
Start: 2021-01-12 | End: 2021-01-12 | Stop reason: SDUPTHER

## 2021-01-12 NOTE — TELEPHONE ENCOUNTER
Pt needs to have medication sent to [facility] on [address] instead of [facility] [address] due to insurance.

## 2021-01-12 NOTE — PATIENT INSTRUCTIONS
Medicare Wellness Visit, Male The best way to live healthy is to have a lifestyle where you eat a well-balanced diet, exercise regularly, limit alcohol use, and quit all forms of tobacco/nicotine, if applicable. Regular preventive services are another way to keep healthy. Preventive services (vaccines, screening tests, monitoring & exams) can help personalize your care plan, which helps you manage your own care. Screening tests can find health problems at the earliest stages, when they are easiest to treat. [facility] follows the current, evidence-based guidelines published by the [facility] Haresh Teresa ([facility]) when recommending preventive services for our patients. Because we follow these guidelines, sometimes recommendations change over time as research supports it. (For example, a prostate screening blood test is no longer routinely recommended for men with no symptoms). Of course, you and your doctor may decide to screen more often for some diseases, based on your risk and co-morbidities (chronic disease you are already diagnosed with). Preventive services for you include: - Medicare offers their members a free annual wellness visit, which is time for you and your primary care provider to discuss and plan for your preventive service needs. Take advantage of this benefit every year! 
-All adults over age 72 should receive the recommended pneumonia vaccines. Current USPSTF guidelines recommend a series of two vaccines for the best pneumonia protection.  
-All adults should have a flu vaccine yearly and tetanus vaccine every 10 years. 
-All adults age 48 and older should receive the shingles vaccines (series of two vaccines). -All adults age 38-68 who are overweight should have a diabetes screening test once every three years. -Other screening tests & preventive services for persons with diabetes include: an eye exam to screen for diabetic retinopathy, a kidney function test, a foot exam, and stricter control over your cholesterol.  
-Cardiovascular screening for adults with routine risk involves an electrocardiogram (ECG) at intervals determined by the provider.  
-Colorectal cancer screening should be done for adults age 54-65 with no increased risk factors for colorectal cancer. There are a number of acceptable methods of screening for this type of cancer. Each test has its own benefits and drawbacks. Discuss with your provider what is most appropriate for you during your annual wellness visit. The different tests include: colonoscopy (considered the best screening method), a fecal occult blood test, a fecal DNA test, and sigmoidoscopy. 
-All adults born between [facility] should be screened once for Hepatitis C. 
-An Abdominal Aortic Aneurysm (AAA) Screening is recommended for men age 73-68 who has ever smoked in their lifetime. Here is a list of your current Health Maintenance items (your personalized list of preventive services) with a due date: 
Health Maintenance Due Topic Date Due  
 Colorectal Screening  04/14/2002  Glaucoma Screening   04/14/2017  AAA Screening  04/14/2017

## 2021-01-12 NOTE — TELEPHONE ENCOUNTER
Nurse from urology is calling stating pt BP is 209/130. They are calling for pt to be seen in office today.  Pt coming in at 3pm

## 2021-01-12 NOTE — PROGRESS NOTES
1. Have you been to the ER, urgent care clinic since your last visit? Hospitalized since your last visit? No  2. Have you seen or consulted any other health care providers outside of the [address] [place] [address] since your last visit? Include any pap smears or colon screening.  No    Health Maintenance Due   Topic Date Due    DTaP/Tdap/Td series (1 - Tdap) 04/14/1973    Shingrix Vaccine Age 50> (1 of 2) 04/14/2002    Colorectal Cancer Screening Combo  04/14/2002    GLAUCOMA SCREENING Q2Y  04/14/2017    AAA Screening 73-69 YO Male Smoking Patients  04/14/2017    Pneumococcal 65+ years (1 of 1 - PPSV23) 04/14/2017    Pneumococcal 65+ yrs at Risk Vaccine (1 of 2 - PCV13) 04/14/2017    Medicare Yearly Exam  06/22/2020    Flu Vaccine (1) 09/01/2020     Chief Complaint   Patient presents with    Blood Pressure Check

## 2021-01-12 NOTE — PROGRESS NOTES
Chief Complaint   Patient presents with    Blood Pressure Check     Patient was seen at urology today, patient is being treated for metastatic prostate cancer. During his visit his blood pressure is noted to be very elevated. Patient reports that he has been placed on a new medication for his metastatic cancer along with prednisone, patient noted some side effects and so he had stopped both his blood pressure medication as well as the cancer medication. When discussing side effects, patient seems to be describing gout-like symptoms. Patient has been checking blood pressure at home, but reports that he plans to buy a new cuff    Subjective: (As above and below)     Chief Complaint   Patient presents with    Blood Pressure Check     he is a 78y.o. year old male who presents for evaluation. Reviewed PmHx, RxHx, FmHx, SocHx, AllgHx and updated in chart. Review of Systems - negative except as listed above    Objective:     Vitals:    01/12/21 1508 01/12/21 1541   BP: (!) 195/88 (!) 174/88   Pulse: 90    Resp: 17    Temp: 97.1 °F (36.2 °C)    TempSrc: Temporal    SpO2: 92%    Weight: 224 lb (101.6 kg)    Height: 5' 8\" (1.727 m)      Physical Examination: General appearance - alert, well appearing, and in no distress  Mental status - alert, oriented to person, place, and time  Ears - bilateral TM's and external ear canals normal  Chest - clear to auscultation, no wheezes, rales or rhonchi, symmetric air entry  Heart - normal rate, regular rhythm, normal S1, S2, no murmurs, rubs, clicks or gallops  Musculoskeletal -swelling of the middle joints of the right second finger, palpable cyst, tender. Range of motion limited due to size of cyst    Assessment/ Plan:   1. Prostate cancer metastatic to bone [facility])  Continue to follow-up with urology    2. Essential hypertension  Resume blood pressure medication, patient agrees. Patient will check blood pressure at home.   Reviewed that side effects patient was experiencing were not actually side effects but another problem altogether. Patient agreed  - METABOLIC PANEL, COMPREHENSIVE  - CBC W/O DIFF  - LIPID PANEL  - HEMOGLOBIN A1C WITH EAG    3. Medicare annual wellness visit, subsequent  -See below    4. Abnormal finding of blood chemistry, unspecified   - HEMOGLOBIN A1C WITH EAG    5. Ganglion cyst of finger  - REFERRAL TO ORTHOPEDICS       I have discussed the diagnosis with the patient and the intended plan as seen in the above orders. The patient has received an after-visit summary and questions were answered concerning future plans. Medication Side Effects and Warnings were discussed with patient: yes  Patient Labs were reviewed: yes  Patient Past Records were reviewed:  yes    Khris Man M.D. This is the Subsequent Medicare Annual Wellness Exam, performed 12 months or more after the Initial AWV or the last Subsequent AWV    I have reviewed the patient's medical history in detail and updated the computerized patient record. Depression Risk Factor Screening:     3 most recent PHQ Screens 1/12/2021   Little interest or pleasure in doing things Not at all   Feeling down, depressed, irritable, or hopeless Not at all   Total Score PHQ 2 0       Alcohol Risk Screen    Do you average more than 1 drink per night or more than 7 drinks a week: No    In the past three months have you have had more than 4 drinks containing alcohol on one occasion: No        Functional Ability and Level of Safety:    Hearing: Hearing is good. Activities of Daily Living: The home contains: no safety equipment. Patient does total self care      Ambulation: with no difficulty     Fall Risk:  Fall Risk Assessment, last 12 mths 1/12/2021   Able to walk? Yes   Fall in past 12 months? 0   Do you feel unsteady?  0   Are you worried about falling 0      Abuse Screen:  Patient is not abused       Cognitive Screening    Has your family/caregiver stated any concerns about your memory: no     Assessment/Plan   Education and counseling provided:  Are appropriate based on today's review and evaluation    Diagnoses and all orders for this visit:    1. Medicare annual wellness visit, subsequent    2. Prostate cancer metastatic to bone ([facility] [address]    3. Essential hypertension  -     amLODIPine (NORVASC) 10 mg tablet; Take 1 Tab by mouth daily for 30 days. Health Maintenance Due     Health Maintenance Due   Topic Date Due    Colorectal Cancer Screening Combo  04/14/2002    GLAUCOMA SCREENING Q2Y  04/14/2017    AAA Screening 73-67 YO Male Smoking Patients  04/14/2017       Patient Care Team   Patient Care Team:  Keny Bay MD as PCP - General (Family Medicine)  Keny Bay MD as PCP - [facility]    History   There is no problem list on file for this patient. Past Medical History:   Diagnosis Date    Hypertension       History reviewed. No pertinent surgical history. Current Outpatient Medications   Medication Sig Dispense Refill    amLODIPine (NORVASC) 10 mg tablet Take 1 Tab by mouth daily for 30 days.  90 Tab 1     No Known Allergies    Family History   Problem Relation Age of Onset    No Known Problems Mother     No Known Problems Father      Social History     Tobacco Use    Smoking status: Current Every Day Smoker     Packs/day: 0.50     Years: 15.00     Pack years: 7.50     Types: Cigarettes    Smokeless tobacco: Never Used   Substance Use Topics    Alcohol use: Yes     Comment: occasionally

## 2021-01-13 LAB
ALBUMIN SERPL-MCNC: 4.1 G/DL (ref 3.8–4.8)
ALBUMIN/GLOB SERPL: 1.5 {RATIO} (ref 1.2–2.2)
ALP SERPL-CCNC: 205 IU/L (ref 39–117)
ALT SERPL-CCNC: 26 IU/L (ref 0–44)
AST SERPL-CCNC: 29 IU/L (ref 0–40)
BILIRUB SERPL-MCNC: 0.3 MG/DL (ref 0–1.2)
BUN SERPL-MCNC: 7 MG/DL (ref 8–27)
BUN/CREAT SERPL: 10 (ref 10–24)
CALCIUM SERPL-MCNC: 9.4 MG/DL (ref 8.6–10.2)
CHLORIDE SERPL-SCNC: 103 MMOL/L (ref 96–106)
CHOLEST SERPL-MCNC: 198 MG/DL (ref 100–199)
CO2 SERPL-SCNC: 31 MMOL/L (ref 20–29)
CREAT SERPL-MCNC: 0.7 MG/DL (ref 0.76–1.27)
ERYTHROCYTE [DISTWIDTH] IN BLOOD BY AUTOMATED COUNT: 12.4 % (ref 11.6–15.4)
EST. AVERAGE GLUCOSE BLD GHB EST-MCNC: 117 MG/DL
GLOBULIN SER CALC-MCNC: 2.8 G/DL (ref 1.5–4.5)
GLUCOSE SERPL-MCNC: 104 MG/DL (ref 65–99)
HBA1C MFR BLD: 5.7 % (ref 4.8–5.6)
HCT VFR BLD AUTO: 43.5 % (ref 37.5–51)
HDLC SERPL-MCNC: 47 MG/DL
HGB BLD-MCNC: 14.7 G/DL (ref 13–17.7)
INTERPRETATION, 910389: NORMAL
LDLC SERPL CALC-MCNC: 115 MG/DL (ref 0–99)
MCH RBC QN AUTO: 31.1 PG (ref 26.6–33)
MCHC RBC AUTO-ENTMCNC: 33.8 G/DL (ref 31.5–35.7)
MCV RBC AUTO: 92 FL (ref 79–97)
PLATELET # BLD AUTO: 152 X10E3/UL (ref 150–450)
POTASSIUM SERPL-SCNC: 3.9 MMOL/L (ref 3.5–5.2)
PROT SERPL-MCNC: 6.9 G/DL (ref 6–8.5)
RBC # BLD AUTO: 4.73 X10E6/UL (ref 4.14–5.8)
SODIUM SERPL-SCNC: 141 MMOL/L (ref 134–144)
TRIGL SERPL-MCNC: 205 MG/DL (ref 0–149)
VLDLC SERPL CALC-MCNC: 36 MG/DL (ref 5–40)
WBC # BLD AUTO: 10.3 X10E3/UL (ref 3.4–10.8)

## 2021-01-14 ENCOUNTER — TELEPHONE (OUTPATIENT)
Dept: FAMILY MEDICINE CLINIC | Age: 71
End: 2021-01-14

## 2021-01-14 NOTE — TELEPHONE ENCOUNTER
This is not true, advised pt to start taking his blood pressure medication, amlodipine. Pt should continue on all other medications as written. Please inform pt and urology.

## 2021-01-14 NOTE — TELEPHONE ENCOUNTER
[facility], \"she received a phone call from the patient stating Dr. Sandhya Miller told him to stop taking the Prednisone\". Asmita Cohen wants to make sure that is correct since patient is on another medication where he should be taking it.     [facility]

## 2021-01-20 ENCOUNTER — HOSPITAL ENCOUNTER (OUTPATIENT)
Dept: MRI IMAGING | Age: 71
Discharge: HOME | End: 2021-01-20
Attending: UROLOGY
Payer: MEDICARE

## 2021-01-20 DIAGNOSIS — C61 MALIGNANT NEOPLASM OF PROSTATE (HCC): ICD-10-CM

## 2021-01-20 PROCEDURE — 74011250636 HC RX REV CODE- 250/636: Performed by: UROLOGY

## 2021-01-20 PROCEDURE — 72158 MRI LUMBAR SPINE W/O & W/DYE: CPT

## 2021-01-20 PROCEDURE — A9575 INJ GADOTERATE MEGLUMI 0.1ML: HCPCS | Performed by: UROLOGY

## 2021-01-20 RX ORDER — GADOTERATE MEGLUMINE 376.9 MG/ML
20 INJECTION INTRAVENOUS
Status: COMPLETED | OUTPATIENT
Start: 2021-01-20 | End: 2021-01-20

## 2021-01-20 RX ADMIN — GADOTERATE MEGLUMINE 20 ML: 376.9 INJECTION INTRAVENOUS at 16:39

## 2021-07-05 DIAGNOSIS — I10 ESSENTIAL HYPERTENSION: ICD-10-CM

## 2021-07-06 RX ORDER — AMLODIPINE BESYLATE 10 MG/1
TABLET ORAL
Qty: 90 TABLET | Refills: 1 | Status: SHIPPED | OUTPATIENT
Start: 2021-07-06

## 2021-08-11 ENCOUNTER — TELEPHONE (OUTPATIENT)
Dept: FAMILY MEDICINE CLINIC | Age: 71
End: 2021-08-11

## 2021-08-11 NOTE — TELEPHONE ENCOUNTER
----- Message from Jairo Randolph sent at 8/11/2021  8:49 AM EDT -----  Regarding: Dr. Saintclair Devoid: [phone]  Appointment not available    Caller's first and last name and relationship to patient (if not the patient): Pt. Best contact number: [phone]      Preferred date and time: Next available. Scheduled appointment date and time: N/a. Reason for appointment: Possible sprained ankle. Details to clarify the request: No appointment until 8/24.        Jairo Randolph

## 2022-03-18 PROBLEM — C79.51 PROSTATE CANCER METASTATIC TO BONE (HCC): Status: ACTIVE | Noted: 2021-01-12

## 2022-03-18 PROBLEM — C61 PROSTATE CANCER METASTATIC TO BONE (HCC): Status: ACTIVE | Noted: 2021-01-12

## 2022-03-19 PROBLEM — I10 ESSENTIAL HYPERTENSION: Status: ACTIVE | Noted: 2021-01-12

## 2022-03-22 ENCOUNTER — OFFICE VISIT (OUTPATIENT)
Dept: FAMILY MEDICINE CLINIC | Age: 72
End: 2022-03-22
Payer: MEDICARE

## 2022-03-22 VITALS
WEIGHT: 229 LBS | RESPIRATION RATE: 16 BRPM | SYSTOLIC BLOOD PRESSURE: 164 MMHG | HEART RATE: 113 BPM | DIASTOLIC BLOOD PRESSURE: 81 MMHG | BODY MASS INDEX: 34.71 KG/M2 | TEMPERATURE: 98.2 F | OXYGEN SATURATION: 95 % | HEIGHT: 68 IN

## 2022-03-22 DIAGNOSIS — C61 PROSTATE CANCER METASTATIC TO BONE (HCC): ICD-10-CM

## 2022-03-22 DIAGNOSIS — Z00.00 MEDICARE ANNUAL WELLNESS VISIT, SUBSEQUENT: ICD-10-CM

## 2022-03-22 DIAGNOSIS — I10 ESSENTIAL HYPERTENSION: Primary | ICD-10-CM

## 2022-03-22 DIAGNOSIS — C79.51 PROSTATE CANCER METASTATIC TO BONE (HCC): ICD-10-CM

## 2022-03-22 PROCEDURE — G8753 SYS BP > OR = 140: HCPCS | Performed by: FAMILY MEDICINE

## 2022-03-22 PROCEDURE — G8427 DOCREV CUR MEDS BY ELIG CLIN: HCPCS | Performed by: FAMILY MEDICINE

## 2022-03-22 PROCEDURE — G0439 PPPS, SUBSEQ VISIT: HCPCS | Performed by: FAMILY MEDICINE

## 2022-03-22 PROCEDURE — G0463 HOSPITAL OUTPT CLINIC VISIT: HCPCS | Performed by: FAMILY MEDICINE

## 2022-03-22 PROCEDURE — 3017F COLORECTAL CA SCREEN DOC REV: CPT | Performed by: FAMILY MEDICINE

## 2022-03-22 PROCEDURE — G8754 DIAS BP LESS 90: HCPCS | Performed by: FAMILY MEDICINE

## 2022-03-22 PROCEDURE — G8536 NO DOC ELDER MAL SCRN: HCPCS | Performed by: FAMILY MEDICINE

## 2022-03-22 PROCEDURE — G8417 CALC BMI ABV UP PARAM F/U: HCPCS | Performed by: FAMILY MEDICINE

## 2022-03-22 PROCEDURE — 1101F PT FALLS ASSESS-DOCD LE1/YR: CPT | Performed by: FAMILY MEDICINE

## 2022-03-22 PROCEDURE — 99213 OFFICE O/P EST LOW 20 MIN: CPT | Performed by: FAMILY MEDICINE

## 2022-03-22 PROCEDURE — G8510 SCR DEP NEG, NO PLAN REQD: HCPCS | Performed by: FAMILY MEDICINE

## 2022-03-22 RX ORDER — VALSARTAN 160 MG/1
160 TABLET ORAL DAILY
Qty: 90 TABLET | Refills: 3 | Status: SHIPPED | OUTPATIENT
Start: 2022-03-22

## 2022-03-22 NOTE — PATIENT INSTRUCTIONS
Medicare Wellness Visit, Male    The best way to live healthy is to have a lifestyle where you eat a well-balanced diet, exercise regularly, limit alcohol use, and quit all forms of tobacco/nicotine, if applicable. Regular preventive services are another way to keep healthy. Preventive services (vaccines, screening tests, monitoring & exams) can help personalize your care plan, which helps you manage your own care. Screening tests can find health problems at the earliest stages, when they are easiest to treat. [facility] follows the current, evidence-based guidelines published by the [facility] Haresh Teresa ([facility]) when recommending preventive services for our patients. Because we follow these guidelines, sometimes recommendations change over time as research supports it. (For example, a prostate screening blood test is no longer routinely recommended for men with no symptoms). Of course, you and your doctor may decide to screen more often for some diseases, based on your risk and co-morbidities (chronic disease you are already diagnosed with). Preventive services for you include:  - Medicare offers their members a free annual wellness visit, which is time for you and your primary care provider to discuss and plan for your preventive service needs. Take advantage of this benefit every year!  -All adults over age 72 should receive the recommended pneumonia vaccines. Current USPSTF guidelines recommend a series of two vaccines for the best pneumonia protection.   -All adults should have a flu vaccine yearly and tetanus vaccine every 10 years.  -All adults age 48 and older should receive the shingles vaccines (series of two vaccines).        -All adults age 38-68 who are overweight should have a diabetes screening test once every three years.   -Other screening tests & preventive services for persons with diabetes include: an eye exam to screen for diabetic retinopathy, a kidney function test, a foot exam, and stricter control over your cholesterol.   -Cardiovascular screening for adults with routine risk involves an electrocardiogram (ECG) at intervals determined by the provider.   -Colorectal cancer screening should be done for adults age 54-65 with no increased risk factors for colorectal cancer. There are a number of acceptable methods of screening for this type of cancer. Each test has its own benefits and drawbacks. Discuss with your provider what is most appropriate for you during your annual wellness visit. The different tests include: colonoscopy (considered the best screening method), a fecal occult blood test, a fecal DNA test, and sigmoidoscopy.  -All adults born between [facility] should be screened once for Hepatitis C.  -An Abdominal Aortic Aneurysm (AAA) Screening is recommended for men age 73-68 who has ever smoked in their lifetime.      Here is a list of your current Health Maintenance items (your personalized list of preventive services) with a due date:  Health Maintenance Due   Topic Date Due    COVID-19 Vaccine (1) Never done    DTaP/Tdap/Td  (1 - Tdap) Never done    Shingles Vaccine (1 of 2) Never done    AAA Screening  Never done    Pneumococcal Vaccine 65+ years at Risk (1 of 2 - PCV13) Never done    Yearly Flu Vaccine (1) Never done

## 2022-03-22 NOTE — PROGRESS NOTES
Chief Complaint   Patient presents with    Hypertension     Pt is currently being treated for prostate cancer. Pt reports that his specialist was concerned with his elevated blood pressures. Pt is currently getting chemo treatments. He is taking amlodipine only for BP control. Subjective: (As above and below)     Chief Complaint   Patient presents with    Hypertension     he is a 73y.o. year old male who presents for evaluation. Reviewed PmHx, RxHx, FmHx, SocHx, AllgHx and updated in chart. Review of Systems - negative except as listed above    Objective:     Vitals:    03/22/22 1503 03/22/22 1514   BP: (!) 182/107 (!) 164/81   Pulse: (!) 113    Resp: 16    Temp: 98.2 °F (36.8 °C)    TempSrc: Oral    SpO2: 95%    Weight: 229 lb (103.9 kg)    Height: 5' 8\" (1.727 m)      Physical Examination: General appearance - alert, well appearing, and in no distress  Mental status - normal mood, behavior, speech, dress, motor activity, and thought processes  Ears - bilateral TM's and external ear canals normal  Chest - clear to auscultation, no wheezes, rales or rhonchi, symmetric air entry  Heart - normal rate, regular rhythm, normal S1, S2, no murmurs, rubs, clicks or gallops  Musculoskeletal - no joint tenderness, deformity or swelling  Extremities - peripheral pulses normal, no pedal edema, no clubbing or cyanosis    Assessment/ Plan:   1. Prostate cancer metastatic to bone [facility])  -currently being actively treated  -followed by specialist     2. Essential hypertension  -add second BP medication  - METABOLIC PANEL, COMPREHENSIVE; Future  - valsartan (DIOVAN) 160 mg tablet; Take 1 Tablet by mouth daily. Dispense: 90 Tablet; Refill: 3    3. Medicare annual wellness visit, subsequent  -see below       I have discussed the diagnosis with the patient and the intended plan as seen in the above orders. The patient has received an after-visit summary and questions were answered concerning future plans. Medication Side Effects and Warnings were discussed with patient: yes  Patient Labs were reviewed: yes  Patient Past Records were reviewed:  yes    Tom Cornell M.D. This is the Subsequent Medicare Annual Wellness Exam, performed 12 months or more after the Initial AWV or the last Subsequent AWV    I have reviewed the patient's medical history in detail and updated the computerized patient record. Assessment/Plan   Education and counseling provided:  Are appropriate based on today's review and evaluation    1. Essential hypertension  -     METABOLIC PANEL, COMPREHENSIVE; Future  -     valsartan (DIOVAN) 160 mg tablet; Take 1 Tablet by mouth daily. , Normal, Disp-90 Tablet, R-3  2. Prostate cancer metastatic to bone ([facility] [address]  3. Medicare annual wellness visit, subsequent       Depression Risk Factor Screening     3 most recent PHQ Screens 3/22/2022   Little interest or pleasure in doing things Not at all   Feeling down, depressed, irritable, or hopeless Not at all   Total Score PHQ 2 0       Alcohol & Drug Abuse Risk Screen    Do you average more than 1 drink per night or more than 7 drinks a week: No    In the past three months have you have had more than 4 drinks containing alcohol on one occasion: No          Functional Ability and Level of Safety    Hearing: Hearing is good. Activities of Daily Living: The home contains: no safety equipment. Patient does total self care      Ambulation: with no difficulty     Fall Risk:  Fall Risk Assessment, last 12 mths 3/22/2022   Able to walk? Yes   Fall in past 12 months? 0   Do you feel unsteady?  0   Are you worried about falling 0      Abuse Screen:  Patient is not abused       Cognitive Screening    Has your family/caregiver stated any concerns about your memory: no       Health Maintenance Due     Health Maintenance Due   Topic Date Due    COVID-19 Vaccine (1) Never done    DTaP/Tdap/Td series (1 - Tdap) Never done    Shingrix Vaccine Age 50> (1 of 2) Never done    AAA Screening 73-69 YO Male Smoking Patients  Never done    Pneumococcal 65+ yrs at Risk Vaccine (1 of 2 - PCV13) Never done    Flu Vaccine (1) Never done       Patient Care Team   Patient Care Team:  Pattie Mills MD as PCP - General (Family Medicine)  [facility] Empaneled Provider    History     Patient Active Problem List   Diagnosis Code    [facility] C61, C79.51    Essential hypertension I10     Past Medical History:   Diagnosis Date    Hypertension       History reviewed. No pertinent surgical history. Current Outpatient Medications   Medication Sig Dispense Refill    valsartan (DIOVAN) 160 mg tablet Take 1 Tablet by mouth daily.  90 Tablet 3    amLODIPine (NORVASC) 10 mg tablet TAKE 1 TABLET BY MOUTH EVERY DAY 90 Tablet 1     No Known Allergies    Family History   Problem Relation Age of Onset    No Known Problems Mother     No Known Problems Father      Social History     Tobacco Use    Smoking status: Current Every Day Smoker     Packs/day: 0.50     Years: 15.00     Pack years: 7.50     Types: Cigarettes    Smokeless tobacco: Never Used   Substance Use Topics    Alcohol use: Yes     Comment: occasionally         Noam Andrade MD

## 2022-03-22 NOTE — PROGRESS NOTES
1. Have you been to the ER, urgent care clinic since your last visit? Hospitalized since your last visit? Yes, [facility] for fractured ankle, on file. 2. Have you seen or consulted any other health care providers outside of the [address] [place] [address] since your last visit? Include any pap smears or colon screening. Yes, [facility] about 3 months ago.      Health Maintenance Due   Topic Date Due    COVID-19 Vaccine (1) Never done    DTaP/Tdap/Td series (1 - Tdap) Never done    Shingrix Vaccine Age 50> (1 of 2) Never done    AAA Screening 73-67 YO Male Smoking Patients  Never done    Pneumococcal 65+ yrs at Risk Vaccine (1 of 2 - PCV13) Never done    Flu Vaccine (1) Never done    Depression Screen  01/12/2022    Medicare Yearly Exam  01/13/2022     Chief Complaint   Patient presents with    Hypertension

## 2022-03-23 LAB
ALBUMIN SERPL-MCNC: 3.9 G/DL (ref 3.5–5)
ALBUMIN/GLOB SERPL: 0.9 {RATIO} (ref 1.1–2.2)
ALP SERPL-CCNC: 158 U/L (ref 45–117)
ALT SERPL-CCNC: 26 U/L (ref 12–78)
ANION GAP SERPL CALC-SCNC: 4 MMOL/L (ref 5–15)
AST SERPL-CCNC: 18 U/L (ref 15–37)
BILIRUB SERPL-MCNC: 0.2 MG/DL (ref 0.2–1)
BUN SERPL-MCNC: 12 MG/DL (ref 6–20)
BUN/CREAT SERPL: 16 (ref 12–20)
CALCIUM SERPL-MCNC: 9.3 MG/DL (ref 8.5–10.1)
CHLORIDE SERPL-SCNC: 106 MMOL/L (ref 97–108)
CO2 SERPL-SCNC: 26 MMOL/L (ref 21–32)
CREAT SERPL-MCNC: 0.76 MG/DL (ref 0.7–1.3)
GLOBULIN SER CALC-MCNC: 4.2 G/DL (ref 2–4)
GLUCOSE SERPL-MCNC: 104 MG/DL (ref 65–100)
POTASSIUM SERPL-SCNC: 3.6 MMOL/L (ref 3.5–5.1)
PROT SERPL-MCNC: 8.1 G/DL (ref 6.4–8.2)
SODIUM SERPL-SCNC: 136 MMOL/L (ref 136–145)

## 2023-07-06 DIAGNOSIS — I10 ESSENTIAL (PRIMARY) HYPERTENSION: ICD-10-CM

## 2023-07-06 RX ORDER — VALSARTAN 160 MG/1
TABLET ORAL
Qty: 90 TABLET | OUTPATIENT
Start: 2023-07-06

## 2023-07-11 RX ORDER — VALSARTAN 160 MG/1
TABLET ORAL
Qty: 30 TABLET | Refills: 0 | Status: SHIPPED | OUTPATIENT
Start: 2023-07-11 | End: 2023-08-04

## 2023-08-04 RX ORDER — VALSARTAN 160 MG/1
TABLET ORAL
Qty: 30 TABLET | Refills: 1 | Status: SHIPPED | OUTPATIENT
Start: 2023-08-04

## 2023-09-04 RX ORDER — VALSARTAN 160 MG/1
TABLET ORAL
Qty: 30 TABLET | Refills: 1 | OUTPATIENT
Start: 2023-09-04

## 2024-05-08 ENCOUNTER — APPOINTMENT (OUTPATIENT)
Facility: HOSPITAL | Age: 74
DRG: 640 | End: 2024-05-08
Payer: MEDICARE

## 2024-05-08 ENCOUNTER — HOSPITAL ENCOUNTER (INPATIENT)
Facility: HOSPITAL | Age: 74
LOS: 6 days | Discharge: HOME HEALTH | DRG: 640 | End: 2024-05-14
Attending: STUDENT IN AN ORGANIZED HEALTH CARE EDUCATION/TRAINING PROGRAM | Admitting: STUDENT IN AN ORGANIZED HEALTH CARE EDUCATION/TRAINING PROGRAM
Payer: MEDICARE

## 2024-05-08 DIAGNOSIS — E83.52 HYPERCALCEMIA OF MALIGNANCY: Primary | ICD-10-CM

## 2024-05-08 DIAGNOSIS — N17.9 AKI (ACUTE KIDNEY INJURY) (HCC): ICD-10-CM

## 2024-05-08 LAB
ALBUMIN SERPL-MCNC: 3.4 G/DL (ref 3.5–5)
ALBUMIN/GLOB SERPL: 0.9 (ref 1.1–2.2)
ALP SERPL-CCNC: 86 U/L (ref 45–117)
ALT SERPL-CCNC: 14 U/L (ref 12–78)
ANION GAP SERPL CALC-SCNC: 11 MMOL/L (ref 5–15)
ANION GAP SERPL CALC-SCNC: 12 MMOL/L (ref 5–15)
AST SERPL-CCNC: 38 U/L (ref 15–37)
BASOPHILS # BLD: 0 K/UL (ref 0–0.1)
BASOPHILS NFR BLD: 0 % (ref 0–1)
BILIRUB SERPL-MCNC: 0.7 MG/DL (ref 0.2–1)
BUN SERPL-MCNC: 63 MG/DL (ref 6–20)
BUN SERPL-MCNC: 64 MG/DL (ref 6–20)
BUN/CREAT SERPL: 20 (ref 12–20)
BUN/CREAT SERPL: 21 (ref 12–20)
CALCIUM SERPL-MCNC: 15 MG/DL (ref 8.5–10.1)
CALCIUM SERPL-MCNC: 15.3 MG/DL (ref 8.5–10.1)
CHLORIDE SERPL-SCNC: 100 MMOL/L (ref 97–108)
CHLORIDE SERPL-SCNC: 96 MMOL/L (ref 97–108)
CO2 SERPL-SCNC: 22 MMOL/L (ref 21–32)
CO2 SERPL-SCNC: 23 MMOL/L (ref 21–32)
CREAT SERPL-MCNC: 3 MG/DL (ref 0.7–1.3)
CREAT SERPL-MCNC: 3.16 MG/DL (ref 0.7–1.3)
DIFFERENTIAL METHOD BLD: ABNORMAL
EKG ATRIAL RATE: 97 BPM
EKG DIAGNOSIS: NORMAL
EKG P AXIS: 45 DEGREES
EKG P-R INTERVAL: 144 MS
EKG Q-T INTERVAL: 350 MS
EKG QRS DURATION: 118 MS
EKG QTC CALCULATION (BAZETT): 444 MS
EKG R AXIS: 29 DEGREES
EKG T AXIS: 8 DEGREES
EKG VENTRICULAR RATE: 97 BPM
EOSINOPHIL # BLD: 0.2 K/UL (ref 0–0.4)
EOSINOPHIL NFR BLD: 2 % (ref 0–7)
ERYTHROCYTE [DISTWIDTH] IN BLOOD BY AUTOMATED COUNT: 14.6 % (ref 11.5–14.5)
GLOBULIN SER CALC-MCNC: 4 G/DL (ref 2–4)
GLUCOSE SERPL-MCNC: 84 MG/DL (ref 65–100)
GLUCOSE SERPL-MCNC: 91 MG/DL (ref 65–100)
HCT VFR BLD AUTO: 33.8 % (ref 36.6–50.3)
HGB BLD-MCNC: 11.4 G/DL (ref 12.1–17)
IMM GRANULOCYTES # BLD AUTO: 0 K/UL (ref 0–0.04)
IMM GRANULOCYTES NFR BLD AUTO: 0 % (ref 0–0.5)
LYMPHOCYTES # BLD: 1.7 K/UL (ref 0.8–3.5)
LYMPHOCYTES NFR BLD: 17 % (ref 12–49)
MAGNESIUM SERPL-MCNC: 2 MG/DL (ref 1.6–2.4)
MCH RBC QN AUTO: 29.3 PG (ref 26–34)
MCHC RBC AUTO-ENTMCNC: 33.7 G/DL (ref 30–36.5)
MCV RBC AUTO: 86.9 FL (ref 80–99)
MONOCYTES # BLD: 1.2 K/UL (ref 0–1)
MONOCYTES NFR BLD: 12 % (ref 5–13)
NEUTS BAND NFR BLD MANUAL: 1 %
NEUTS SEG # BLD: 6.9 K/UL (ref 1.8–8)
NEUTS SEG NFR BLD: 68 % (ref 32–75)
NRBC # BLD: 0 K/UL (ref 0–0.01)
NRBC BLD-RTO: 0 PER 100 WBC
PLATELET # BLD AUTO: 89 K/UL (ref 150–400)
PMV BLD AUTO: 11.2 FL (ref 8.9–12.9)
POTASSIUM SERPL-SCNC: 3.5 MMOL/L (ref 3.5–5.1)
POTASSIUM SERPL-SCNC: 3.8 MMOL/L (ref 3.5–5.1)
PROT SERPL-MCNC: 7.4 G/DL (ref 6.4–8.2)
RBC # BLD AUTO: 3.89 M/UL (ref 4.1–5.7)
RBC MORPH BLD: ABNORMAL
SODIUM SERPL-SCNC: 131 MMOL/L (ref 136–145)
SODIUM SERPL-SCNC: 133 MMOL/L (ref 136–145)
WBC # BLD AUTO: 10 K/UL (ref 4.1–11.1)

## 2024-05-08 PROCEDURE — 2580000003 HC RX 258: Performed by: STUDENT IN AN ORGANIZED HEALTH CARE EDUCATION/TRAINING PROGRAM

## 2024-05-08 PROCEDURE — 99285 EMERGENCY DEPT VISIT HI MDM: CPT

## 2024-05-08 PROCEDURE — 36415 COLL VENOUS BLD VENIPUNCTURE: CPT

## 2024-05-08 PROCEDURE — 6370000000 HC RX 637 (ALT 250 FOR IP): Performed by: STUDENT IN AN ORGANIZED HEALTH CARE EDUCATION/TRAINING PROGRAM

## 2024-05-08 PROCEDURE — 80053 COMPREHEN METABOLIC PANEL: CPT

## 2024-05-08 PROCEDURE — 83735 ASSAY OF MAGNESIUM: CPT

## 2024-05-08 PROCEDURE — 51798 US URINE CAPACITY MEASURE: CPT

## 2024-05-08 PROCEDURE — 6360000002 HC RX W HCPCS: Performed by: STUDENT IN AN ORGANIZED HEALTH CARE EDUCATION/TRAINING PROGRAM

## 2024-05-08 PROCEDURE — 51701 INSERT BLADDER CATHETER: CPT

## 2024-05-08 PROCEDURE — 85025 COMPLETE CBC W/AUTO DIFF WBC: CPT

## 2024-05-08 PROCEDURE — 2060000000 HC ICU INTERMEDIATE R&B

## 2024-05-08 PROCEDURE — 74176 CT ABD & PELVIS W/O CONTRAST: CPT

## 2024-05-08 PROCEDURE — 93005 ELECTROCARDIOGRAM TRACING: CPT | Performed by: STUDENT IN AN ORGANIZED HEALTH CARE EDUCATION/TRAINING PROGRAM

## 2024-05-08 RX ORDER — OXYCODONE HYDROCHLORIDE 5 MG/1
5 TABLET ORAL EVERY 4 HOURS PRN
Status: DISCONTINUED | OUTPATIENT
Start: 2024-05-08 | End: 2024-05-14 | Stop reason: HOSPADM

## 2024-05-08 RX ORDER — SODIUM CHLORIDE 0.9 % (FLUSH) 0.9 %
5-40 SYRINGE (ML) INJECTION EVERY 12 HOURS SCHEDULED
Status: DISCONTINUED | OUTPATIENT
Start: 2024-05-08 | End: 2024-05-14 | Stop reason: HOSPADM

## 2024-05-08 RX ORDER — SODIUM CHLORIDE 450 MG/100ML
INJECTION, SOLUTION INTRAVENOUS CONTINUOUS
Status: DISCONTINUED | OUTPATIENT
Start: 2024-05-08 | End: 2024-05-09

## 2024-05-08 RX ORDER — SODIUM CHLORIDE 9 MG/ML
INJECTION, SOLUTION INTRAVENOUS PRN
Status: DISCONTINUED | OUTPATIENT
Start: 2024-05-08 | End: 2024-05-14 | Stop reason: HOSPADM

## 2024-05-08 RX ORDER — ONDANSETRON 2 MG/ML
4 INJECTION INTRAMUSCULAR; INTRAVENOUS EVERY 4 HOURS PRN
Status: DISCONTINUED | OUTPATIENT
Start: 2024-05-08 | End: 2024-05-08

## 2024-05-08 RX ORDER — OXYCODONE HYDROCHLORIDE 5 MG/1
5 TABLET ORAL EVERY 4 HOURS PRN
Status: DISCONTINUED | OUTPATIENT
Start: 2024-05-08 | End: 2024-05-08

## 2024-05-08 RX ORDER — ACETAMINOPHEN 650 MG/1
650 SUPPOSITORY RECTAL EVERY 6 HOURS PRN
Status: DISCONTINUED | OUTPATIENT
Start: 2024-05-08 | End: 2024-05-14 | Stop reason: HOSPADM

## 2024-05-08 RX ORDER — LABETALOL HYDROCHLORIDE 5 MG/ML
5 INJECTION INTRAVENOUS EVERY 6 HOURS PRN
Status: DISCONTINUED | OUTPATIENT
Start: 2024-05-08 | End: 2024-05-14 | Stop reason: HOSPADM

## 2024-05-08 RX ORDER — LANOLIN ALCOHOL/MO/W.PET/CERES
3 CREAM (GRAM) TOPICAL NIGHTLY PRN
Status: DISCONTINUED | OUTPATIENT
Start: 2024-05-08 | End: 2024-05-14 | Stop reason: HOSPADM

## 2024-05-08 RX ORDER — HEPARIN SODIUM 5000 [USP'U]/ML
5000 INJECTION, SOLUTION INTRAVENOUS; SUBCUTANEOUS EVERY 8 HOURS SCHEDULED
Status: DISCONTINUED | OUTPATIENT
Start: 2024-05-08 | End: 2024-05-14 | Stop reason: HOSPADM

## 2024-05-08 RX ORDER — SODIUM CHLORIDE 0.9 % (FLUSH) 0.9 %
5-40 SYRINGE (ML) INJECTION PRN
Status: DISCONTINUED | OUTPATIENT
Start: 2024-05-08 | End: 2024-05-14 | Stop reason: HOSPADM

## 2024-05-08 RX ORDER — ACETAMINOPHEN 325 MG/1
650 TABLET ORAL EVERY 4 HOURS PRN
Status: DISCONTINUED | OUTPATIENT
Start: 2024-05-08 | End: 2024-05-08

## 2024-05-08 RX ORDER — ONDANSETRON 2 MG/ML
4 INJECTION INTRAMUSCULAR; INTRAVENOUS EVERY 6 HOURS PRN
Status: DISCONTINUED | OUTPATIENT
Start: 2024-05-08 | End: 2024-05-14 | Stop reason: HOSPADM

## 2024-05-08 RX ORDER — FUROSEMIDE 10 MG/ML
60 INJECTION INTRAMUSCULAR; INTRAVENOUS ONCE
Status: COMPLETED | OUTPATIENT
Start: 2024-05-08 | End: 2024-05-08

## 2024-05-08 RX ORDER — ACETAMINOPHEN 325 MG/1
650 TABLET ORAL EVERY 6 HOURS PRN
Status: DISCONTINUED | OUTPATIENT
Start: 2024-05-08 | End: 2024-05-14 | Stop reason: HOSPADM

## 2024-05-08 RX ORDER — 0.9 % SODIUM CHLORIDE 0.9 %
1000 INTRAVENOUS SOLUTION INTRAVENOUS ONCE
Status: COMPLETED | OUTPATIENT
Start: 2024-05-08 | End: 2024-05-08

## 2024-05-08 RX ORDER — POLYETHYLENE GLYCOL 3350 17 G/17G
17 POWDER, FOR SOLUTION ORAL DAILY PRN
Status: DISCONTINUED | OUTPATIENT
Start: 2024-05-08 | End: 2024-05-14 | Stop reason: HOSPADM

## 2024-05-08 RX ORDER — ONDANSETRON 4 MG/1
4 TABLET, ORALLY DISINTEGRATING ORAL EVERY 8 HOURS PRN
Status: DISCONTINUED | OUTPATIENT
Start: 2024-05-08 | End: 2024-05-14 | Stop reason: HOSPADM

## 2024-05-08 RX ORDER — 0.9 % SODIUM CHLORIDE 0.9 %
1000 INTRAVENOUS SOLUTION INTRAVENOUS ONCE
Status: DISCONTINUED | OUTPATIENT
Start: 2024-05-08 | End: 2024-05-08

## 2024-05-08 RX ORDER — ACETAMINOPHEN 325 MG/1
650 TABLET ORAL
Status: COMPLETED | OUTPATIENT
Start: 2024-05-08 | End: 2024-05-08

## 2024-05-08 RX ORDER — OXYCODONE HYDROCHLORIDE 5 MG/1
5 TABLET ORAL
Status: COMPLETED | OUTPATIENT
Start: 2024-05-08 | End: 2024-05-08

## 2024-05-08 RX ADMIN — ONDANSETRON 4 MG: 2 INJECTION INTRAMUSCULAR; INTRAVENOUS at 21:22

## 2024-05-08 RX ADMIN — ACETAMINOPHEN 650 MG: 325 TABLET ORAL at 18:26

## 2024-05-08 RX ADMIN — OXYCODONE HYDROCHLORIDE 5 MG: 5 TABLET ORAL at 18:26

## 2024-05-08 RX ADMIN — SODIUM CHLORIDE 1000 ML: 9 INJECTION, SOLUTION INTRAVENOUS at 18:29

## 2024-05-08 RX ADMIN — FUROSEMIDE 60 MG: 10 INJECTION, SOLUTION INTRAMUSCULAR; INTRAVENOUS at 21:21

## 2024-05-08 RX ADMIN — SODIUM CHLORIDE: 4.5 INJECTION, SOLUTION INTRAVENOUS at 20:45

## 2024-05-08 RX ADMIN — SODIUM CHLORIDE, PRESERVATIVE FREE 10 ML: 5 INJECTION INTRAVENOUS at 21:28

## 2024-05-08 RX ADMIN — HEPARIN SODIUM 5000 UNITS: 5000 INJECTION INTRAVENOUS; SUBCUTANEOUS at 21:21

## 2024-05-08 ASSESSMENT — PAIN SCALES - GENERAL: PAINLEVEL_OUTOF10: 7

## 2024-05-08 NOTE — ED PROVIDER NOTES
[facility] EMERGENCY DEPT  EMERGENCY DEPARTMENT ENCOUNTER       Pt Name: Oscar Briceno  MRN: [id number]  Birthdate 1950  Date of evaluation: 5/8/2024  Provider: Ambrocio Phelps MD   PCP: Tanner Ojeda MD  Note Started: 6:44 PM 5/8/24     CHIEF COMPLAINT       Chief Complaint   Patient presents with    Failure To Thrive     Pt presents in wheelchair to triage with decreased appetite and energy x several months, pt with history of prostate cancer    Hip Pain     L sided hip pain radiating from leg up to side        HISTORY OF PRESENT ILLNESS: 1 or more elements      History From: Patient  None     Oscar Briceno is a 74 y.o. male who presents to the emergency department with progressive fatigue, reduced appetite.  Symptoms worsening over the past 2 to 3 months.  Patient also reports some low back pain which has been constant during this time, patient denies radiation of pain down the legs or to the hips.  Patient reports history of prostate cancer but he is not on any active treatment, states he has an appointment scheduled to see a urologist soon     Nursing Notes were all reviewed and agreed with or any disagreements were addressed in the HPI.     REVIEW OF SYSTEMS      Review of Systems     Positives and Pertinent negatives as per HPI.    PAST HISTORY     Past Medical History:  Past Medical History:   Diagnosis Date    Hypertension          Past Surgical History:  No past surgical history on file.    Family History:  Family History   Problem Relation Age of Onset    No Known Problems Mother     No Known Problems Father        Social History:  Social History     Tobacco Use    Smoking status: Every Day     Current packs/day: 0.50     Types: Cigarettes    Smokeless tobacco: Never   Substance Use Topics    Alcohol use: Yes    Drug use: Never       Allergies:  No Known Allergies    CURRENT MEDICATIONS      Previous Medications    AMLODIPINE (NORVASC) 10 MG TABLET    Take 1 tablet by mouth daily    VALSARTAN

## 2024-05-08 NOTE — H&P
Hospitalist Admission Note    NAME:  Oscar Briceno   :  1950   MRN:  [id number]     Date/Time:  2024 10:41 PM    Patient PCP: Tanner Ojeda MD    ______________________________________________________________________  Given the patient's current clinical presentation, I have a high level of concern for decompensation if discharged from the emergency department.  Complex decision making was performed, which includes reviewing the patient's available past medical records, laboratory results, and x-ray films.       My assessment of this patient's clinical condition and my plan of care is as follows.    Assessment / Plan:    Active Problems:  Hypercalcemia of malignancy  Metastatic prostate cancer  QUINCY  Thrombocytopenia  L1 pathologic fracture  Essential hypertension  Failure to obtain routine medical screenings  Tobacco use    Plan:  Hypercalcemia of malignancy  Metastatic prostate cancer  QUINCY  Admit to stepdown unit  Status post 2 L bolus in the ED follow with half-normal saline at 200 cc/h  -Monitor volume status closely and adjust as indicated  60 mg IV Lasix x 1  Strict intake and output with daily weights  Trend BMP every 4 hours  Nephrology consulted, greatly appreciate their expertise  -No symptoms of hypocalcemia at present so we will hold off on calcitonin for now, may benefit from bisphosphonate-defer to nephrology  Trend renal function  Renally dose medications and avoid nephrotoxic agents  Urology consulted, greatly appreciate their expertise  Palliative care consulted, greatly appreciate their expertise  Bladder management protocol    L1 pathologic fracture  Oxycodone as needed for pain  Consider radiation oncology referral  No evidence of cauda equina on exam    Thrombocytopenia  Likely secondary to malignancy-trend CBC, may need to hold DVT prophylaxis if decreases further    Essential hypertension  Monitor blood pressure and address as needed  As needed labetalol for hypertensive

## 2024-05-09 ENCOUNTER — APPOINTMENT (OUTPATIENT)
Facility: HOSPITAL | Age: 74
DRG: 640 | End: 2024-05-09
Payer: MEDICARE

## 2024-05-09 PROBLEM — C79.51 PAIN DUE TO MALIGNANT NEOPLASM METASTATIC TO BONE (HCC): Status: ACTIVE | Noted: 2024-05-09

## 2024-05-09 PROBLEM — G89.3 PAIN DUE TO MALIGNANT NEOPLASM METASTATIC TO BONE (HCC): Status: ACTIVE | Noted: 2024-05-09

## 2024-05-09 PROBLEM — Z51.5 PALLIATIVE CARE BY SPECIALIST: Status: ACTIVE | Noted: 2024-05-09

## 2024-05-09 LAB
ANION GAP SERPL CALC-SCNC: 7 MMOL/L (ref 5–15)
ANION GAP SERPL CALC-SCNC: 9 MMOL/L (ref 5–15)
ANION GAP SERPL CALC-SCNC: 9 MMOL/L (ref 5–15)
BASOPHILS # BLD: 0 K/UL (ref 0–0.1)
BASOPHILS NFR BLD: 0 % (ref 0–1)
BUN SERPL-MCNC: 58 MG/DL (ref 6–20)
BUN SERPL-MCNC: 63 MG/DL (ref 6–20)
BUN SERPL-MCNC: 64 MG/DL (ref 6–20)
BUN/CREAT SERPL: 20 (ref 12–20)
BUN/CREAT SERPL: 21 (ref 12–20)
BUN/CREAT SERPL: 22 (ref 12–20)
CALCIUM SERPL-MCNC: 13.8 MG/DL (ref 8.5–10.1)
CALCIUM SERPL-MCNC: 14.3 MG/DL (ref 8.5–10.1)
CALCIUM SERPL-MCNC: 14.7 MG/DL (ref 8.5–10.1)
CHLORIDE SERPL-SCNC: 96 MMOL/L (ref 97–108)
CHLORIDE SERPL-SCNC: 99 MMOL/L (ref 97–108)
CHLORIDE SERPL-SCNC: 99 MMOL/L (ref 97–108)
CO2 SERPL-SCNC: 23 MMOL/L (ref 21–32)
CO2 SERPL-SCNC: 24 MMOL/L (ref 21–32)
CO2 SERPL-SCNC: 27 MMOL/L (ref 21–32)
CREAT SERPL-MCNC: 2.86 MG/DL (ref 0.7–1.3)
CREAT SERPL-MCNC: 2.88 MG/DL (ref 0.7–1.3)
CREAT SERPL-MCNC: 2.99 MG/DL (ref 0.7–1.3)
DIFFERENTIAL METHOD BLD: ABNORMAL
EOSINOPHIL # BLD: 0.4 K/UL (ref 0–0.4)
EOSINOPHIL NFR BLD: 5 % (ref 0–7)
ERYTHROCYTE [DISTWIDTH] IN BLOOD BY AUTOMATED COUNT: 14.6 % (ref 11.5–14.5)
GLUCOSE SERPL-MCNC: 73 MG/DL (ref 65–100)
GLUCOSE SERPL-MCNC: 75 MG/DL (ref 65–100)
GLUCOSE SERPL-MCNC: 94 MG/DL (ref 65–100)
HCT VFR BLD AUTO: 30.1 % (ref 36.6–50.3)
HGB BLD-MCNC: 9.9 G/DL (ref 12.1–17)
IMM GRANULOCYTES # BLD AUTO: 0 K/UL (ref 0–0.04)
IMM GRANULOCYTES NFR BLD AUTO: 0 % (ref 0–0.5)
LYMPHOCYTES # BLD: 1.5 K/UL (ref 0.8–3.5)
LYMPHOCYTES NFR BLD: 20 % (ref 12–49)
MCH RBC QN AUTO: 29.1 PG (ref 26–34)
MCHC RBC AUTO-ENTMCNC: 32.9 G/DL (ref 30–36.5)
MCV RBC AUTO: 88.5 FL (ref 80–99)
MONOCYTES # BLD: 0.5 K/UL (ref 0–1)
MONOCYTES NFR BLD: 7 % (ref 5–13)
NEUTS BAND NFR BLD MANUAL: 1 %
NEUTS SEG # BLD: 5.3 K/UL (ref 1.8–8)
NEUTS SEG NFR BLD: 67 % (ref 32–75)
NRBC # BLD: 0 K/UL (ref 0–0.01)
NRBC BLD-RTO: 0 PER 100 WBC
PLATELET # BLD AUTO: 70 K/UL (ref 150–400)
PMV BLD AUTO: 11.2 FL (ref 8.9–12.9)
POTASSIUM SERPL-SCNC: 3.3 MMOL/L (ref 3.5–5.1)
POTASSIUM SERPL-SCNC: 3.4 MMOL/L (ref 3.5–5.1)
POTASSIUM SERPL-SCNC: 3.9 MMOL/L (ref 3.5–5.1)
RBC # BLD AUTO: 3.4 M/UL (ref 4.1–5.7)
RBC MORPH BLD: ABNORMAL
SODIUM SERPL-SCNC: 130 MMOL/L (ref 136–145)
SODIUM SERPL-SCNC: 131 MMOL/L (ref 136–145)
SODIUM SERPL-SCNC: 132 MMOL/L (ref 136–145)
WBC # BLD AUTO: 7.7 K/UL (ref 4.1–11.1)

## 2024-05-09 PROCEDURE — 80048 BASIC METABOLIC PNL TOTAL CA: CPT

## 2024-05-09 PROCEDURE — 6370000000 HC RX 637 (ALT 250 FOR IP): Performed by: INTERNAL MEDICINE

## 2024-05-09 PROCEDURE — 71250 CT THORAX DX C-: CPT

## 2024-05-09 PROCEDURE — 36415 COLL VENOUS BLD VENIPUNCTURE: CPT

## 2024-05-09 PROCEDURE — 2580000003 HC RX 258: Performed by: STUDENT IN AN ORGANIZED HEALTH CARE EDUCATION/TRAINING PROGRAM

## 2024-05-09 PROCEDURE — 2060000000 HC ICU INTERMEDIATE R&B

## 2024-05-09 PROCEDURE — 72148 MRI LUMBAR SPINE W/O DYE: CPT

## 2024-05-09 PROCEDURE — 6360000002 HC RX W HCPCS: Performed by: INTERNAL MEDICINE

## 2024-05-09 PROCEDURE — 51798 US URINE CAPACITY MEASURE: CPT

## 2024-05-09 PROCEDURE — 6370000000 HC RX 637 (ALT 250 FOR IP): Performed by: STUDENT IN AN ORGANIZED HEALTH CARE EDUCATION/TRAINING PROGRAM

## 2024-05-09 PROCEDURE — 85025 COMPLETE CBC W/AUTO DIFF WBC: CPT

## 2024-05-09 PROCEDURE — 76937 US GUIDE VASCULAR ACCESS: CPT

## 2024-05-09 PROCEDURE — 97116 GAIT TRAINING THERAPY: CPT

## 2024-05-09 PROCEDURE — 97162 PT EVAL MOD COMPLEX 30 MIN: CPT

## 2024-05-09 PROCEDURE — 99223 1ST HOSP IP/OBS HIGH 75: CPT | Performed by: NURSE PRACTITIONER

## 2024-05-09 PROCEDURE — 6360000002 HC RX W HCPCS: Performed by: STUDENT IN AN ORGANIZED HEALTH CARE EDUCATION/TRAINING PROGRAM

## 2024-05-09 PROCEDURE — 72195 MRI PELVIS W/O DYE: CPT

## 2024-05-09 RX ORDER — FUROSEMIDE 10 MG/ML
60 INJECTION INTRAMUSCULAR; INTRAVENOUS ONCE
Status: COMPLETED | OUTPATIENT
Start: 2024-05-09 | End: 2024-05-09

## 2024-05-09 RX ORDER — SODIUM CHLORIDE AND POTASSIUM CHLORIDE 150; 450 MG/100ML; MG/100ML
INJECTION, SOLUTION INTRAVENOUS CONTINUOUS
Status: DISCONTINUED | OUTPATIENT
Start: 2024-05-09 | End: 2024-05-10

## 2024-05-09 RX ORDER — BICALUTAMIDE 50 MG/1
50 TABLET, FILM COATED ORAL DAILY
Status: DISCONTINUED | OUTPATIENT
Start: 2024-05-09 | End: 2024-05-14 | Stop reason: HOSPADM

## 2024-05-09 RX ORDER — ZOLEDRONIC ACID 0.04 MG/ML
4 INJECTION, SOLUTION INTRAVENOUS ONCE
Status: COMPLETED | OUTPATIENT
Start: 2024-05-09 | End: 2024-05-09

## 2024-05-09 RX ADMIN — POTASSIUM CHLORIDE AND SODIUM CHLORIDE: 450; 150 INJECTION, SOLUTION INTRAVENOUS at 06:58

## 2024-05-09 RX ADMIN — ZOLEDRONIC ACID 4 MG: 0.04 INJECTION, SOLUTION INTRAVENOUS at 17:35

## 2024-05-09 RX ADMIN — BICALUTAMIDE 50 MG: 50 TABLET, FILM COATED ORAL at 17:35

## 2024-05-09 RX ADMIN — POTASSIUM BICARBONATE 40 MEQ: 782 TABLET, EFFERVESCENT ORAL at 05:39

## 2024-05-09 RX ADMIN — HEPARIN SODIUM 5000 UNITS: 5000 INJECTION INTRAVENOUS; SUBCUTANEOUS at 21:30

## 2024-05-09 RX ADMIN — SODIUM CHLORIDE: 4.5 INJECTION, SOLUTION INTRAVENOUS at 01:50

## 2024-05-09 RX ADMIN — SODIUM CHLORIDE, PRESERVATIVE FREE 10 ML: 5 INJECTION INTRAVENOUS at 21:58

## 2024-05-09 RX ADMIN — FUROSEMIDE 60 MG: 10 INJECTION, SOLUTION INTRAMUSCULAR; INTRAVENOUS at 06:59

## 2024-05-09 RX ADMIN — POTASSIUM CHLORIDE AND SODIUM CHLORIDE: 450; 150 INJECTION, SOLUTION INTRAVENOUS at 20:14

## 2024-05-09 RX ADMIN — SODIUM CHLORIDE, PRESERVATIVE FREE 10 ML: 5 INJECTION INTRAVENOUS at 21:30

## 2024-05-09 RX ADMIN — HEPARIN SODIUM 5000 UNITS: 5000 INJECTION INTRAVENOUS; SUBCUTANEOUS at 05:39

## 2024-05-09 RX ADMIN — HEPARIN SODIUM 5000 UNITS: 5000 INJECTION INTRAVENOUS; SUBCUTANEOUS at 14:19

## 2024-05-09 RX ADMIN — POTASSIUM CHLORIDE AND SODIUM CHLORIDE: 450; 150 INJECTION, SOLUTION INTRAVENOUS at 14:20

## 2024-05-09 ASSESSMENT — PAIN SCALES - GENERAL
PAINLEVEL_OUTOF10: 0

## 2024-05-09 NOTE — CARE COORDINATION
Care Management Initial Assessment       RUR: 15% (Moderate RUR)  Readmission? No  1st IM letter given? Yes - 05/08  1st  letter given: No     05/09/24 0804   Service Assessment   Patient Orientation Alert and Oriented;Person;Place;Situation;Self   Cognition Alert   History Provided By Patient   Primary Caregiver Self   Accompanied By/Relationship no one   Support Systems Family Members  (Zoe Keating (sister)  [phone])   Patient's Healthcare Decision Maker is: Legal Next of Kin   PCP Verified by CM Yes   Last Visit to PCP   (Has not seen his pcp in couple years)   Prior Functional Level Independent in ADLs/IADLs   Current Functional Level Independent in ADLs/IADLs   Can patient return to prior living arrangement Yes   Ability to make needs known: Good   Family able to assist with home care needs: Yes   Would you like for me to discuss the discharge plan with any other family members/significant others, and if so, who? Yes  (Zoe Keating (sister) [phone])   Financial Resources Medicare;Food [place]  (FS: $96 a month; and SSI $144)   Social/Functional History   Lives With Alone   Type of Home House   Home Layout One level   Home Access Stairs to enter with rails   Entrance Stairs - Number of Steps 5   Entrance Stairs - Rails Both   Home Equipment Cane;Rollator   Active  Yes   Discharge Planning   Type of Residence House   Current Services Prior To Admission None   Potential Assistance Needed N/A   Patient expects to be discharged to: House     The  (CM) conducted an initial meeting with the patient at the bedside, formally introducing themselves and clarifying their role in discharge planning and transitional care. Demographic and insurance details were verified for accuracy. Notably, the patient has no prior history with home health, skilled nursing facilities (SNF), or inpatient rehabilitation (IPR). Moreover, the patient is alert, able to communicate  needs effectively,

## 2024-05-10 ENCOUNTER — APPOINTMENT (OUTPATIENT)
Facility: HOSPITAL | Age: 74
DRG: 640 | End: 2024-05-10
Payer: MEDICARE

## 2024-05-10 ENCOUNTER — HOSPITAL ENCOUNTER (INPATIENT)
Facility: HOSPITAL | Age: 74
Discharge: HOME | DRG: 640 | End: 2024-05-13
Payer: MEDICARE

## 2024-05-10 LAB
ANION GAP SERPL CALC-SCNC: 11 MMOL/L (ref 5–15)
ANION GAP SERPL CALC-SCNC: 11 MMOL/L (ref 5–15)
ANION GAP SERPL CALC-SCNC: 7 MMOL/L (ref 5–15)
ANION GAP SERPL CALC-SCNC: 9 MMOL/L (ref 5–15)
BUN SERPL-MCNC: 53 MG/DL (ref 6–20)
BUN SERPL-MCNC: 56 MG/DL (ref 6–20)
BUN SERPL-MCNC: 58 MG/DL (ref 6–20)
BUN SERPL-MCNC: 58 MG/DL (ref 6–20)
BUN/CREAT SERPL: 21 (ref 12–20)
BUN/CREAT SERPL: 23 (ref 12–20)
CALCIUM SERPL-MCNC: 13.5 MG/DL (ref 8.5–10.1)
CALCIUM SERPL-MCNC: 13.7 MG/DL (ref 8.5–10.1)
CALCIUM SERPL-MCNC: 13.9 MG/DL (ref 8.5–10.1)
CALCIUM SERPL-MCNC: 14 MG/DL (ref 8.5–10.1)
CHLORIDE SERPL-SCNC: 97 MMOL/L (ref 97–108)
CHLORIDE SERPL-SCNC: 98 MMOL/L (ref 97–108)
CHLORIDE SERPL-SCNC: 99 MMOL/L (ref 97–108)
CHLORIDE SERPL-SCNC: 99 MMOL/L (ref 97–108)
CO2 SERPL-SCNC: 22 MMOL/L (ref 21–32)
CO2 SERPL-SCNC: 23 MMOL/L (ref 21–32)
CO2 SERPL-SCNC: 23 MMOL/L (ref 21–32)
CO2 SERPL-SCNC: 24 MMOL/L (ref 21–32)
CREAT SERPL-MCNC: 2.49 MG/DL (ref 0.7–1.3)
CREAT SERPL-MCNC: 2.55 MG/DL (ref 0.7–1.3)
CREAT SERPL-MCNC: 2.65 MG/DL (ref 0.7–1.3)
CREAT SERPL-MCNC: 2.71 MG/DL (ref 0.7–1.3)
GLUCOSE BLD STRIP.AUTO-MCNC: 107 MG/DL (ref 65–117)
GLUCOSE BLD STRIP.AUTO-MCNC: 64 MG/DL (ref 65–117)
GLUCOSE SERPL-MCNC: 62 MG/DL (ref 65–100)
GLUCOSE SERPL-MCNC: 63 MG/DL (ref 65–100)
GLUCOSE SERPL-MCNC: 68 MG/DL (ref 65–100)
GLUCOSE SERPL-MCNC: 72 MG/DL (ref 65–100)
POTASSIUM SERPL-SCNC: 3.8 MMOL/L (ref 3.5–5.1)
POTASSIUM SERPL-SCNC: 4 MMOL/L (ref 3.5–5.1)
POTASSIUM SERPL-SCNC: 4.3 MMOL/L (ref 3.5–5.1)
POTASSIUM SERPL-SCNC: 5.3 MMOL/L (ref 3.5–5.1)
PSA SERPL-MCNC: ABNORMAL NG/ML (ref 0.01–4)
SERVICE CMNT-IMP: ABNORMAL
SERVICE CMNT-IMP: NORMAL
SODIUM SERPL-SCNC: 127 MMOL/L (ref 136–145)
SODIUM SERPL-SCNC: 132 MMOL/L (ref 136–145)
URATE SERPL-MCNC: 15.5 MG/DL (ref 3.5–7.2)

## 2024-05-10 PROCEDURE — 6370000000 HC RX 637 (ALT 250 FOR IP): Performed by: STUDENT IN AN ORGANIZED HEALTH CARE EDUCATION/TRAINING PROGRAM

## 2024-05-10 PROCEDURE — 97116 GAIT TRAINING THERAPY: CPT

## 2024-05-10 PROCEDURE — 2580000003 HC RX 258: Performed by: STUDENT IN AN ORGANIZED HEALTH CARE EDUCATION/TRAINING PROGRAM

## 2024-05-10 PROCEDURE — 2580000003 HC RX 258: Performed by: INTERNAL MEDICINE

## 2024-05-10 PROCEDURE — 99231 SBSQ HOSP IP/OBS SF/LOW 25: CPT | Performed by: NURSE PRACTITIONER

## 2024-05-10 PROCEDURE — 84550 ASSAY OF BLOOD/URIC ACID: CPT

## 2024-05-10 PROCEDURE — 2060000000 HC ICU INTERMEDIATE R&B

## 2024-05-10 PROCEDURE — 3430000000 HC RX DIAGNOSTIC RADIOPHARMACEUTICAL: Performed by: INTERNAL MEDICINE

## 2024-05-10 PROCEDURE — 78306 BONE IMAGING WHOLE BODY: CPT | Performed by: INTERNAL MEDICINE

## 2024-05-10 PROCEDURE — A9503 TC99M MEDRONATE: HCPCS | Performed by: INTERNAL MEDICINE

## 2024-05-10 PROCEDURE — 6360000002 HC RX W HCPCS: Performed by: INTERNAL MEDICINE

## 2024-05-10 PROCEDURE — 6370000000 HC RX 637 (ALT 250 FOR IP): Performed by: INTERNAL MEDICINE

## 2024-05-10 PROCEDURE — 6360000002 HC RX W HCPCS: Performed by: STUDENT IN AN ORGANIZED HEALTH CARE EDUCATION/TRAINING PROGRAM

## 2024-05-10 PROCEDURE — 84153 ASSAY OF PSA TOTAL: CPT

## 2024-05-10 PROCEDURE — 80048 BASIC METABOLIC PNL TOTAL CA: CPT

## 2024-05-10 PROCEDURE — 36415 COLL VENOUS BLD VENIPUNCTURE: CPT

## 2024-05-10 PROCEDURE — 82962 GLUCOSE BLOOD TEST: CPT

## 2024-05-10 RX ORDER — DEXTROSE MONOHYDRATE 100 MG/ML
INJECTION, SOLUTION INTRAVENOUS CONTINUOUS PRN
Status: DISCONTINUED | OUTPATIENT
Start: 2024-05-10 | End: 2024-05-14 | Stop reason: HOSPADM

## 2024-05-10 RX ORDER — TC 99M MEDRONATE 20 MG/10ML
21.5 INJECTION, POWDER, LYOPHILIZED, FOR SOLUTION INTRAVENOUS
Status: COMPLETED | OUTPATIENT
Start: 2024-05-10 | End: 2024-05-10

## 2024-05-10 RX ORDER — SODIUM CHLORIDE AND POTASSIUM CHLORIDE 150; 900 MG/100ML; MG/100ML
INJECTION, SOLUTION INTRAVENOUS CONTINUOUS
Status: DISCONTINUED | OUTPATIENT
Start: 2024-05-10 | End: 2024-05-11

## 2024-05-10 RX ORDER — GLUCAGON 1 MG/ML
1 KIT INJECTION PRN
Status: DISCONTINUED | OUTPATIENT
Start: 2024-05-10 | End: 2024-05-14 | Stop reason: HOSPADM

## 2024-05-10 RX ORDER — LIDOCAINE 4 G/G
1 PATCH TOPICAL DAILY
Status: DISCONTINUED | OUTPATIENT
Start: 2024-05-10 | End: 2024-05-14 | Stop reason: HOSPADM

## 2024-05-10 RX ADMIN — POTASSIUM CHLORIDE AND SODIUM CHLORIDE: 900; 150 INJECTION, SOLUTION INTRAVENOUS at 23:31

## 2024-05-10 RX ADMIN — DEXTROSE MONOHYDRATE 125 ML: 100 INJECTION, SOLUTION INTRAVENOUS at 21:39

## 2024-05-10 RX ADMIN — BICALUTAMIDE 50 MG: 50 TABLET, FILM COATED ORAL at 11:32

## 2024-05-10 RX ADMIN — POTASSIUM CHLORIDE AND SODIUM CHLORIDE: 450; 150 INJECTION, SOLUTION INTRAVENOUS at 03:44

## 2024-05-10 RX ADMIN — POLYETHYLENE GLYCOL 3350 17 G: 17 POWDER, FOR SOLUTION ORAL at 12:19

## 2024-05-10 RX ADMIN — HEPARIN SODIUM 5000 UNITS: 5000 INJECTION INTRAVENOUS; SUBCUTANEOUS at 14:11

## 2024-05-10 RX ADMIN — OXYCODONE 5 MG: 5 TABLET ORAL at 08:46

## 2024-05-10 RX ADMIN — POTASSIUM CHLORIDE AND SODIUM CHLORIDE: 900; 150 INJECTION, SOLUTION INTRAVENOUS at 12:30

## 2024-05-10 RX ADMIN — SODIUM CHLORIDE 6 MG: 9 INJECTION, SOLUTION INTRAVENOUS at 16:54

## 2024-05-10 RX ADMIN — ONDANSETRON 4 MG: 2 INJECTION INTRAMUSCULAR; INTRAVENOUS at 21:29

## 2024-05-10 RX ADMIN — HEPARIN SODIUM 5000 UNITS: 5000 INJECTION INTRAVENOUS; SUBCUTANEOUS at 06:33

## 2024-05-10 RX ADMIN — OXYCODONE 5 MG: 5 TABLET ORAL at 19:12

## 2024-05-10 RX ADMIN — ONDANSETRON 4 MG: 2 INJECTION INTRAMUSCULAR; INTRAVENOUS at 17:37

## 2024-05-10 RX ADMIN — OXYCODONE 5 MG: 5 TABLET ORAL at 14:10

## 2024-05-10 RX ADMIN — TC 99M MEDRONATE 21.5 MILLICURIE: 20 INJECTION, POWDER, LYOPHILIZED, FOR SOLUTION INTRAVENOUS at 09:50

## 2024-05-10 RX ADMIN — SODIUM CHLORIDE, PRESERVATIVE FREE 10 ML: 5 INJECTION INTRAVENOUS at 21:24

## 2024-05-10 RX ADMIN — SODIUM CHLORIDE, PRESERVATIVE FREE 5 ML: 5 INJECTION INTRAVENOUS at 11:39

## 2024-05-10 RX ADMIN — HEPARIN SODIUM 5000 UNITS: 5000 INJECTION INTRAVENOUS; SUBCUTANEOUS at 21:29

## 2024-05-10 ASSESSMENT — PAIN DESCRIPTION - ORIENTATION
ORIENTATION: LEFT;LOWER
ORIENTATION: LEFT
ORIENTATION: RIGHT

## 2024-05-10 ASSESSMENT — PAIN SCALES - GENERAL
PAINLEVEL_OUTOF10: 6
PAINLEVEL_OUTOF10: 0

## 2024-05-10 ASSESSMENT — PAIN DESCRIPTION - PAIN TYPE
TYPE: CHRONIC PAIN
TYPE: CHRONIC PAIN

## 2024-05-10 ASSESSMENT — PAIN DESCRIPTION - ONSET: ONSET: GRADUAL

## 2024-05-10 ASSESSMENT — PAIN DESCRIPTION - DESCRIPTORS
DESCRIPTORS: ACHING

## 2024-05-10 ASSESSMENT — PAIN - FUNCTIONAL ASSESSMENT: PAIN_FUNCTIONAL_ASSESSMENT: ACTIVITIES ARE NOT PREVENTED

## 2024-05-10 ASSESSMENT — PAIN DESCRIPTION - FREQUENCY
FREQUENCY: CONTINUOUS
FREQUENCY: CONTINUOUS

## 2024-05-10 ASSESSMENT — PAIN DESCRIPTION - LOCATION
LOCATION: ABDOMEN
LOCATION: HIP
LOCATION: HIP
LOCATION: FLANK;BACK

## 2024-05-11 ENCOUNTER — APPOINTMENT (OUTPATIENT)
Facility: HOSPITAL | Age: 74
DRG: 640 | End: 2024-05-11
Payer: MEDICARE

## 2024-05-11 LAB
ANION GAP SERPL CALC-SCNC: 5 MMOL/L (ref 5–15)
ANION GAP SERPL CALC-SCNC: 5 MMOL/L (ref 5–15)
ANION GAP SERPL CALC-SCNC: 6 MMOL/L (ref 5–15)
BASOPHILS # BLD: 0 K/UL (ref 0–0.1)
BASOPHILS NFR BLD: 0 % (ref 0–1)
BUN SERPL-MCNC: 42 MG/DL (ref 6–20)
BUN SERPL-MCNC: 44 MG/DL (ref 6–20)
BUN SERPL-MCNC: 47 MG/DL (ref 6–20)
BUN/CREAT SERPL: 17 (ref 12–20)
BUN/CREAT SERPL: 18 (ref 12–20)
BUN/CREAT SERPL: 19 (ref 12–20)
CALCIUM SERPL-MCNC: 11.2 MG/DL (ref 8.5–10.1)
CALCIUM SERPL-MCNC: 11.3 MG/DL (ref 8.5–10.1)
CALCIUM SERPL-MCNC: 12 MG/DL (ref 8.5–10.1)
CHLORIDE SERPL-SCNC: 103 MMOL/L (ref 97–108)
CHLORIDE SERPL-SCNC: 104 MMOL/L (ref 97–108)
CHLORIDE SERPL-SCNC: 105 MMOL/L (ref 97–108)
CO2 SERPL-SCNC: 23 MMOL/L (ref 21–32)
CO2 SERPL-SCNC: 23 MMOL/L (ref 21–32)
CO2 SERPL-SCNC: 24 MMOL/L (ref 21–32)
CREAT SERPL-MCNC: 2.43 MG/DL (ref 0.7–1.3)
CREAT SERPL-MCNC: 2.43 MG/DL (ref 0.7–1.3)
CREAT SERPL-MCNC: 2.44 MG/DL (ref 0.7–1.3)
DIFFERENTIAL METHOD BLD: ABNORMAL
EOSINOPHIL # BLD: 0.2 K/UL (ref 0–0.4)
EOSINOPHIL NFR BLD: 3 % (ref 0–7)
ERYTHROCYTE [DISTWIDTH] IN BLOOD BY AUTOMATED COUNT: 14.8 % (ref 11.5–14.5)
GLUCOSE BLD STRIP.AUTO-MCNC: 103 MG/DL (ref 65–117)
GLUCOSE BLD STRIP.AUTO-MCNC: 123 MG/DL (ref 65–117)
GLUCOSE BLD STRIP.AUTO-MCNC: 81 MG/DL (ref 65–117)
GLUCOSE BLD STRIP.AUTO-MCNC: 84 MG/DL (ref 65–117)
GLUCOSE BLD STRIP.AUTO-MCNC: 90 MG/DL (ref 65–117)
GLUCOSE BLD STRIP.AUTO-MCNC: 91 MG/DL (ref 65–117)
GLUCOSE BLD STRIP.AUTO-MCNC: 95 MG/DL (ref 65–117)
GLUCOSE SERPL-MCNC: 75 MG/DL (ref 65–100)
GLUCOSE SERPL-MCNC: 78 MG/DL (ref 65–100)
GLUCOSE SERPL-MCNC: 88 MG/DL (ref 65–100)
HCT VFR BLD AUTO: 28.4 % (ref 36.6–50.3)
HGB BLD-MCNC: 9.4 G/DL (ref 12.1–17)
IMM GRANULOCYTES # BLD AUTO: 0.2 K/UL (ref 0–0.04)
IMM GRANULOCYTES NFR BLD AUTO: 2 % (ref 0–0.5)
LYMPHOCYTES # BLD: 1.1 K/UL (ref 0.8–3.5)
LYMPHOCYTES NFR BLD: 16 % (ref 12–49)
MCH RBC QN AUTO: 29.3 PG (ref 26–34)
MCHC RBC AUTO-ENTMCNC: 33.1 G/DL (ref 30–36.5)
MCV RBC AUTO: 88.5 FL (ref 80–99)
MONOCYTES # BLD: 0.4 K/UL (ref 0–1)
MONOCYTES NFR BLD: 6 % (ref 5–13)
NEUTS SEG # BLD: 4.8 K/UL (ref 1.8–8)
NEUTS SEG NFR BLD: 73 % (ref 32–75)
NRBC # BLD: 0.02 K/UL (ref 0–0.01)
NRBC BLD-RTO: 0.3 PER 100 WBC
PLATELET # BLD AUTO: 65 K/UL (ref 150–400)
PMV BLD AUTO: 11.6 FL (ref 8.9–12.9)
POTASSIUM SERPL-SCNC: 4.8 MMOL/L (ref 3.5–5.1)
POTASSIUM SERPL-SCNC: 5 MMOL/L (ref 3.5–5.1)
POTASSIUM SERPL-SCNC: 5 MMOL/L (ref 3.5–5.1)
RBC # BLD AUTO: 3.21 M/UL (ref 4.1–5.7)
SERVICE CMNT-IMP: ABNORMAL
SERVICE CMNT-IMP: NORMAL
SODIUM SERPL-SCNC: 132 MMOL/L (ref 136–145)
SODIUM SERPL-SCNC: 133 MMOL/L (ref 136–145)
SODIUM SERPL-SCNC: 133 MMOL/L (ref 136–145)
URATE SERPL-MCNC: 4.6 MG/DL (ref 3.5–7.2)
WBC # BLD AUTO: 6.6 K/UL (ref 4.1–11.1)

## 2024-05-11 PROCEDURE — 6370000000 HC RX 637 (ALT 250 FOR IP): Performed by: STUDENT IN AN ORGANIZED HEALTH CARE EDUCATION/TRAINING PROGRAM

## 2024-05-11 PROCEDURE — 6360000002 HC RX W HCPCS: Performed by: INTERNAL MEDICINE

## 2024-05-11 PROCEDURE — 97166 OT EVAL MOD COMPLEX 45 MIN: CPT

## 2024-05-11 PROCEDURE — 6360000002 HC RX W HCPCS: Performed by: STUDENT IN AN ORGANIZED HEALTH CARE EDUCATION/TRAINING PROGRAM

## 2024-05-11 PROCEDURE — 2060000000 HC ICU INTERMEDIATE R&B

## 2024-05-11 PROCEDURE — 82962 GLUCOSE BLOOD TEST: CPT

## 2024-05-11 PROCEDURE — 85025 COMPLETE CBC W/AUTO DIFF WBC: CPT

## 2024-05-11 PROCEDURE — 6370000000 HC RX 637 (ALT 250 FOR IP): Performed by: INTERNAL MEDICINE

## 2024-05-11 PROCEDURE — 84550 ASSAY OF BLOOD/URIC ACID: CPT

## 2024-05-11 PROCEDURE — 73030 X-RAY EXAM OF SHOULDER: CPT

## 2024-05-11 PROCEDURE — 97535 SELF CARE MNGMENT TRAINING: CPT

## 2024-05-11 PROCEDURE — 2580000003 HC RX 258: Performed by: INTERNAL MEDICINE

## 2024-05-11 PROCEDURE — 2580000003 HC RX 258: Performed by: STUDENT IN AN ORGANIZED HEALTH CARE EDUCATION/TRAINING PROGRAM

## 2024-05-11 PROCEDURE — 36415 COLL VENOUS BLD VENIPUNCTURE: CPT

## 2024-05-11 PROCEDURE — 80048 BASIC METABOLIC PNL TOTAL CA: CPT

## 2024-05-11 RX ORDER — SODIUM CHLORIDE 9 MG/ML
INJECTION, SOLUTION INTRAVENOUS CONTINUOUS
Status: DISCONTINUED | OUTPATIENT
Start: 2024-05-11 | End: 2024-05-14

## 2024-05-11 RX ADMIN — SODIUM CHLORIDE, PRESERVATIVE FREE 10 ML: 5 INJECTION INTRAVENOUS at 09:05

## 2024-05-11 RX ADMIN — HEPARIN SODIUM 5000 UNITS: 5000 INJECTION INTRAVENOUS; SUBCUTANEOUS at 14:27

## 2024-05-11 RX ADMIN — SODIUM CHLORIDE, PRESERVATIVE FREE 10 ML: 5 INJECTION INTRAVENOUS at 20:52

## 2024-05-11 RX ADMIN — POTASSIUM CHLORIDE AND SODIUM CHLORIDE: 900; 150 INJECTION, SOLUTION INTRAVENOUS at 09:03

## 2024-05-11 RX ADMIN — SODIUM CHLORIDE: 9 INJECTION, SOLUTION INTRAVENOUS at 14:46

## 2024-05-11 RX ADMIN — HEPARIN SODIUM 5000 UNITS: 5000 INJECTION INTRAVENOUS; SUBCUTANEOUS at 06:08

## 2024-05-11 RX ADMIN — SODIUM CHLORIDE: 9 INJECTION, SOLUTION INTRAVENOUS at 23:46

## 2024-05-11 RX ADMIN — BICALUTAMIDE 50 MG: 50 TABLET, FILM COATED ORAL at 08:56

## 2024-05-11 RX ADMIN — OXYCODONE 5 MG: 5 TABLET ORAL at 13:19

## 2024-05-11 RX ADMIN — HEPARIN SODIUM 5000 UNITS: 5000 INJECTION INTRAVENOUS; SUBCUTANEOUS at 20:49

## 2024-05-11 ASSESSMENT — PAIN SCALES - GENERAL
PAINLEVEL_OUTOF10: 4
PAINLEVEL_OUTOF10: 0
PAINLEVEL_OUTOF10: 0
PAINLEVEL_OUTOF10: 4
PAINLEVEL_OUTOF10: 8
PAINLEVEL_OUTOF10: 0
PAINLEVEL_OUTOF10: 5

## 2024-05-11 ASSESSMENT — PAIN DESCRIPTION - ORIENTATION
ORIENTATION: LEFT

## 2024-05-11 ASSESSMENT — PAIN DESCRIPTION - LOCATION
LOCATION: HIP

## 2024-05-12 LAB
ANION GAP SERPL CALC-SCNC: 4 MMOL/L (ref 5–15)
ANION GAP SERPL CALC-SCNC: 5 MMOL/L (ref 5–15)
BASOPHILS # BLD: 0 K/UL (ref 0–0.1)
BASOPHILS # BLD: 0 K/UL (ref 0–0.1)
BASOPHILS NFR BLD: 0 % (ref 0–1)
BASOPHILS NFR BLD: 1 % (ref 0–1)
BUN SERPL-MCNC: 37 MG/DL (ref 6–20)
BUN SERPL-MCNC: 42 MG/DL (ref 6–20)
BUN/CREAT SERPL: 17 (ref 12–20)
BUN/CREAT SERPL: 18 (ref 12–20)
CALCIUM SERPL-MCNC: 10.6 MG/DL (ref 8.5–10.1)
CALCIUM SERPL-MCNC: 10.7 MG/DL (ref 8.5–10.1)
CHLORIDE SERPL-SCNC: 107 MMOL/L (ref 97–108)
CHLORIDE SERPL-SCNC: 107 MMOL/L (ref 97–108)
CO2 SERPL-SCNC: 23 MMOL/L (ref 21–32)
CO2 SERPL-SCNC: 24 MMOL/L (ref 21–32)
CREAT SERPL-MCNC: 2.23 MG/DL (ref 0.7–1.3)
CREAT SERPL-MCNC: 2.32 MG/DL (ref 0.7–1.3)
DIFFERENTIAL METHOD BLD: ABNORMAL
DIFFERENTIAL METHOD BLD: ABNORMAL
EOSINOPHIL # BLD: 0.1 K/UL (ref 0–0.4)
EOSINOPHIL # BLD: 0.2 K/UL (ref 0–0.4)
EOSINOPHIL NFR BLD: 2 % (ref 0–7)
EOSINOPHIL NFR BLD: 2 % (ref 0–7)
ERYTHROCYTE [DISTWIDTH] IN BLOOD BY AUTOMATED COUNT: 15.4 % (ref 11.5–14.5)
ERYTHROCYTE [DISTWIDTH] IN BLOOD BY AUTOMATED COUNT: 15.5 % (ref 11.5–14.5)
GLUCOSE BLD STRIP.AUTO-MCNC: 82 MG/DL (ref 65–117)
GLUCOSE BLD STRIP.AUTO-MCNC: 87 MG/DL (ref 65–117)
GLUCOSE SERPL-MCNC: 76 MG/DL (ref 65–100)
GLUCOSE SERPL-MCNC: 77 MG/DL (ref 65–100)
HCT VFR BLD AUTO: 26.2 % (ref 36.6–50.3)
HCT VFR BLD AUTO: 27.8 % (ref 36.6–50.3)
HGB BLD-MCNC: 8.5 G/DL (ref 12.1–17)
HGB BLD-MCNC: 9 G/DL (ref 12.1–17)
IMM GRANULOCYTES # BLD AUTO: 0.1 K/UL (ref 0–0.04)
IMM GRANULOCYTES # BLD AUTO: 0.1 K/UL (ref 0–0.04)
IMM GRANULOCYTES NFR BLD AUTO: 2 % (ref 0–0.5)
IMM GRANULOCYTES NFR BLD AUTO: 2 % (ref 0–0.5)
LYMPHOCYTES # BLD: 1.3 K/UL (ref 0.8–3.5)
LYMPHOCYTES # BLD: 1.4 K/UL (ref 0.8–3.5)
LYMPHOCYTES NFR BLD: 21 % (ref 12–49)
LYMPHOCYTES NFR BLD: 22 % (ref 12–49)
MAGNESIUM SERPL-MCNC: 1.7 MG/DL (ref 1.6–2.4)
MCH RBC QN AUTO: 29.1 PG (ref 26–34)
MCH RBC QN AUTO: 29.2 PG (ref 26–34)
MCHC RBC AUTO-ENTMCNC: 32.4 G/DL (ref 30–36.5)
MCHC RBC AUTO-ENTMCNC: 32.4 G/DL (ref 30–36.5)
MCV RBC AUTO: 90 FL (ref 80–99)
MCV RBC AUTO: 90 FL (ref 80–99)
MONOCYTES # BLD: 0.4 K/UL (ref 0–1)
MONOCYTES # BLD: 0.5 K/UL (ref 0–1)
MONOCYTES NFR BLD: 7 % (ref 5–13)
MONOCYTES NFR BLD: 8 % (ref 5–13)
NEUTS SEG # BLD: 4.1 K/UL (ref 1.8–8)
NEUTS SEG # BLD: 4.3 K/UL (ref 1.8–8)
NEUTS SEG NFR BLD: 66 % (ref 32–75)
NEUTS SEG NFR BLD: 68 % (ref 32–75)
NRBC # BLD: 0 K/UL (ref 0–0.01)
NRBC # BLD: 0 K/UL (ref 0–0.01)
NRBC BLD-RTO: 0 PER 100 WBC
NRBC BLD-RTO: 0 PER 100 WBC
PHOSPHATE SERPL-MCNC: 1.5 MG/DL (ref 2.6–4.7)
PLATELET # BLD AUTO: 58 K/UL (ref 150–400)
PLATELET # BLD AUTO: 61 K/UL (ref 150–400)
PMV BLD AUTO: 10.6 FL (ref 8.9–12.9)
PMV BLD AUTO: 11.2 FL (ref 8.9–12.9)
POTASSIUM SERPL-SCNC: 4.3 MMOL/L (ref 3.5–5.1)
POTASSIUM SERPL-SCNC: 4.7 MMOL/L (ref 3.5–5.1)
RBC # BLD AUTO: 2.91 M/UL (ref 4.1–5.7)
RBC # BLD AUTO: 3.09 M/UL (ref 4.1–5.7)
RBC MORPH BLD: ABNORMAL
RBC MORPH BLD: ABNORMAL
SERVICE CMNT-IMP: NORMAL
SERVICE CMNT-IMP: NORMAL
SODIUM SERPL-SCNC: 135 MMOL/L (ref 136–145)
SODIUM SERPL-SCNC: 135 MMOL/L (ref 136–145)
URATE SERPL-MCNC: 1.5 MG/DL (ref 3.5–7.2)
WBC # BLD AUTO: 6 K/UL (ref 4.1–11.1)
WBC # BLD AUTO: 6.4 K/UL (ref 4.1–11.1)

## 2024-05-12 PROCEDURE — 2580000003 HC RX 258: Performed by: STUDENT IN AN ORGANIZED HEALTH CARE EDUCATION/TRAINING PROGRAM

## 2024-05-12 PROCEDURE — 6370000000 HC RX 637 (ALT 250 FOR IP): Performed by: INTERNAL MEDICINE

## 2024-05-12 PROCEDURE — 6360000002 HC RX W HCPCS: Performed by: STUDENT IN AN ORGANIZED HEALTH CARE EDUCATION/TRAINING PROGRAM

## 2024-05-12 PROCEDURE — 84550 ASSAY OF BLOOD/URIC ACID: CPT

## 2024-05-12 PROCEDURE — 82962 GLUCOSE BLOOD TEST: CPT

## 2024-05-12 PROCEDURE — 2580000003 HC RX 258: Performed by: INTERNAL MEDICINE

## 2024-05-12 PROCEDURE — 6360000002 HC RX W HCPCS: Performed by: NURSE PRACTITIONER

## 2024-05-12 PROCEDURE — 80048 BASIC METABOLIC PNL TOTAL CA: CPT

## 2024-05-12 PROCEDURE — 83735 ASSAY OF MAGNESIUM: CPT

## 2024-05-12 PROCEDURE — 36415 COLL VENOUS BLD VENIPUNCTURE: CPT

## 2024-05-12 PROCEDURE — 84100 ASSAY OF PHOSPHORUS: CPT

## 2024-05-12 PROCEDURE — 2060000000 HC ICU INTERMEDIATE R&B

## 2024-05-12 PROCEDURE — 85025 COMPLETE CBC W/AUTO DIFF WBC: CPT

## 2024-05-12 RX ORDER — MAGNESIUM SULFATE 1 G/100ML
1000 INJECTION INTRAVENOUS ONCE
Status: COMPLETED | OUTPATIENT
Start: 2024-05-12 | End: 2024-05-12

## 2024-05-12 RX ADMIN — BICALUTAMIDE 50 MG: 50 TABLET, FILM COATED ORAL at 08:48

## 2024-05-12 RX ADMIN — HEPARIN SODIUM 5000 UNITS: 5000 INJECTION INTRAVENOUS; SUBCUTANEOUS at 14:49

## 2024-05-12 RX ADMIN — HEPARIN SODIUM 5000 UNITS: 5000 INJECTION INTRAVENOUS; SUBCUTANEOUS at 06:28

## 2024-05-12 RX ADMIN — SODIUM CHLORIDE, PRESERVATIVE FREE 10 ML: 5 INJECTION INTRAVENOUS at 08:54

## 2024-05-12 RX ADMIN — SODIUM CHLORIDE: 9 INJECTION, SOLUTION INTRAVENOUS at 21:27

## 2024-05-12 RX ADMIN — MAGNESIUM SULFATE HEPTAHYDRATE 1000 MG: 1 INJECTION, SOLUTION INTRAVENOUS at 22:13

## 2024-05-12 RX ADMIN — SODIUM CHLORIDE: 9 INJECTION, SOLUTION INTRAVENOUS at 10:29

## 2024-05-12 RX ADMIN — SODIUM CHLORIDE, PRESERVATIVE FREE 10 ML: 5 INJECTION INTRAVENOUS at 20:12

## 2024-05-12 ASSESSMENT — PAIN SCALES - GENERAL
PAINLEVEL_OUTOF10: 2
PAINLEVEL_OUTOF10: 0

## 2024-05-12 ASSESSMENT — PAIN DESCRIPTION - ORIENTATION: ORIENTATION: LEFT

## 2024-05-12 ASSESSMENT — PAIN DESCRIPTION - LOCATION: LOCATION: HIP

## 2024-05-13 LAB
ANION GAP SERPL CALC-SCNC: 9 MMOL/L (ref 5–15)
BASOPHILS # BLD: 0.1 K/UL (ref 0–0.1)
BASOPHILS NFR BLD: 1 % (ref 0–1)
BUN SERPL-MCNC: 35 MG/DL (ref 6–20)
BUN/CREAT SERPL: 16 (ref 12–20)
CALCIUM SERPL-MCNC: 10.8 MG/DL (ref 8.5–10.1)
CHLORIDE SERPL-SCNC: 107 MMOL/L (ref 97–108)
CO2 SERPL-SCNC: 20 MMOL/L (ref 21–32)
CREAT SERPL-MCNC: 2.25 MG/DL (ref 0.7–1.3)
DIFFERENTIAL METHOD BLD: ABNORMAL
EKG ATRIAL RATE: 128 BPM
EKG DIAGNOSIS: NORMAL
EKG P-R INTERVAL: 200 MS
EKG Q-T INTERVAL: 312 MS
EKG QRS DURATION: 102 MS
EKG QTC CALCULATION (BAZETT): 455 MS
EKG R AXIS: 8 DEGREES
EKG T AXIS: -59 DEGREES
EKG VENTRICULAR RATE: 128 BPM
EOSINOPHIL # BLD: 0.1 K/UL (ref 0–0.4)
EOSINOPHIL NFR BLD: 2 % (ref 0–7)
ERYTHROCYTE [DISTWIDTH] IN BLOOD BY AUTOMATED COUNT: 15.4 % (ref 11.5–14.5)
GLUCOSE BLD STRIP.AUTO-MCNC: 108 MG/DL (ref 65–117)
GLUCOSE BLD STRIP.AUTO-MCNC: 131 MG/DL (ref 65–117)
GLUCOSE BLD STRIP.AUTO-MCNC: 137 MG/DL (ref 65–117)
GLUCOSE SERPL-MCNC: 83 MG/DL (ref 65–100)
HCT VFR BLD AUTO: 28.4 % (ref 36.6–50.3)
HGB BLD-MCNC: 9.2 G/DL (ref 12.1–17)
IMM GRANULOCYTES # BLD AUTO: 0 K/UL (ref 0–0.04)
IMM GRANULOCYTES NFR BLD AUTO: 0 % (ref 0–0.5)
LYMPHOCYTES # BLD: 1.2 K/UL (ref 0.8–3.5)
LYMPHOCYTES NFR BLD: 19 % (ref 12–49)
MAGNESIUM SERPL-MCNC: 1.9 MG/DL (ref 1.6–2.4)
MCH RBC QN AUTO: 29 PG (ref 26–34)
MCHC RBC AUTO-ENTMCNC: 32.4 G/DL (ref 30–36.5)
MCV RBC AUTO: 89.6 FL (ref 80–99)
MONOCYTES # BLD: 0.1 K/UL (ref 0–1)
MONOCYTES NFR BLD: 2 % (ref 5–13)
MYELOCYTES NFR BLD MANUAL: 1 %
NEUTS BAND NFR BLD MANUAL: 1 %
NEUTS SEG # BLD: 4.8 K/UL (ref 1.8–8)
NEUTS SEG NFR BLD: 74 % (ref 32–75)
NRBC # BLD: 0 K/UL (ref 0–0.01)
NRBC BLD-RTO: 0 PER 100 WBC
PHOSPHATE SERPL-MCNC: 1.4 MG/DL (ref 2.6–4.7)
PLATELET # BLD AUTO: 67 K/UL (ref 150–400)
PMV BLD AUTO: 11 FL (ref 8.9–12.9)
POTASSIUM SERPL-SCNC: 4.2 MMOL/L (ref 3.5–5.1)
RBC # BLD AUTO: 3.17 M/UL (ref 4.1–5.7)
RBC MORPH BLD: ABNORMAL
SERVICE CMNT-IMP: ABNORMAL
SERVICE CMNT-IMP: ABNORMAL
SERVICE CMNT-IMP: NORMAL
SODIUM SERPL-SCNC: 136 MMOL/L (ref 136–145)
URATE SERPL-MCNC: 1.4 MG/DL (ref 3.5–7.2)
WBC # BLD AUTO: 6.4 K/UL (ref 4.1–11.1)

## 2024-05-13 PROCEDURE — 84550 ASSAY OF BLOOD/URIC ACID: CPT

## 2024-05-13 PROCEDURE — 82962 GLUCOSE BLOOD TEST: CPT

## 2024-05-13 PROCEDURE — 2500000003 HC RX 250 WO HCPCS: Performed by: INTERNAL MEDICINE

## 2024-05-13 PROCEDURE — 2580000003 HC RX 258: Performed by: STUDENT IN AN ORGANIZED HEALTH CARE EDUCATION/TRAINING PROGRAM

## 2024-05-13 PROCEDURE — 84100 ASSAY OF PHOSPHORUS: CPT

## 2024-05-13 PROCEDURE — 2060000000 HC ICU INTERMEDIATE R&B

## 2024-05-13 PROCEDURE — 2580000003 HC RX 258: Performed by: INTERNAL MEDICINE

## 2024-05-13 PROCEDURE — 85025 COMPLETE CBC W/AUTO DIFF WBC: CPT

## 2024-05-13 PROCEDURE — 80048 BASIC METABOLIC PNL TOTAL CA: CPT

## 2024-05-13 PROCEDURE — 97116 GAIT TRAINING THERAPY: CPT

## 2024-05-13 PROCEDURE — 36415 COLL VENOUS BLD VENIPUNCTURE: CPT

## 2024-05-13 PROCEDURE — 6370000000 HC RX 637 (ALT 250 FOR IP): Performed by: STUDENT IN AN ORGANIZED HEALTH CARE EDUCATION/TRAINING PROGRAM

## 2024-05-13 PROCEDURE — 83735 ASSAY OF MAGNESIUM: CPT

## 2024-05-13 PROCEDURE — 6370000000 HC RX 637 (ALT 250 FOR IP): Performed by: INTERNAL MEDICINE

## 2024-05-13 RX ORDER — METOPROLOL TARTRATE 1 MG/ML
5 INJECTION, SOLUTION INTRAVENOUS EVERY 6 HOURS PRN
Status: DISCONTINUED | OUTPATIENT
Start: 2024-05-13 | End: 2024-05-14 | Stop reason: HOSPADM

## 2024-05-13 RX ADMIN — BICALUTAMIDE 50 MG: 50 TABLET, FILM COATED ORAL at 08:35

## 2024-05-13 RX ADMIN — METOPROLOL TARTRATE 25 MG: 25 TABLET, FILM COATED ORAL at 10:18

## 2024-05-13 RX ADMIN — SODIUM CHLORIDE: 9 INJECTION, SOLUTION INTRAVENOUS at 10:22

## 2024-05-13 RX ADMIN — METOPROLOL TARTRATE 25 MG: 25 TABLET, FILM COATED ORAL at 20:19

## 2024-05-13 RX ADMIN — SODIUM CHLORIDE, PRESERVATIVE FREE 10 ML: 5 INJECTION INTRAVENOUS at 20:35

## 2024-05-13 RX ADMIN — SODIUM CHLORIDE, PRESERVATIVE FREE 10 ML: 5 INJECTION INTRAVENOUS at 08:34

## 2024-05-13 RX ADMIN — SODIUM PHOSPHATE, MONOBASIC, MONOHYDRATE AND SODIUM PHOSPHATE, DIBASIC, ANHYDROUS 15 MMOL: 142; 276 INJECTION, SOLUTION INTRAVENOUS at 11:44

## 2024-05-13 RX ADMIN — ACETAMINOPHEN 650 MG: 325 TABLET ORAL at 20:18

## 2024-05-13 RX ADMIN — MELATONIN 3 MG: at 20:19

## 2024-05-13 ASSESSMENT — PAIN DESCRIPTION - ONSET: ONSET: GRADUAL

## 2024-05-13 ASSESSMENT — PAIN SCALES - GENERAL
PAINLEVEL_OUTOF10: 3
PAINLEVEL_OUTOF10: 0

## 2024-05-13 ASSESSMENT — PAIN DESCRIPTION - DESCRIPTORS: DESCRIPTORS: ACHING

## 2024-05-13 ASSESSMENT — PAIN DESCRIPTION - ORIENTATION: ORIENTATION: LEFT

## 2024-05-13 ASSESSMENT — PAIN DESCRIPTION - LOCATION: LOCATION: ARM

## 2024-05-13 ASSESSMENT — PAIN DESCRIPTION - PAIN TYPE: TYPE: CHRONIC PAIN

## 2024-05-13 ASSESSMENT — PAIN DESCRIPTION - FREQUENCY: FREQUENCY: CONTINUOUS

## 2024-05-13 ASSESSMENT — PAIN - FUNCTIONAL ASSESSMENT: PAIN_FUNCTIONAL_ASSESSMENT: ACTIVITIES ARE NOT PREVENTED

## 2024-05-13 NOTE — CARE COORDINATION
Transition of Care Plan:    RUR: 16%  Prior Level of Functioning: independent  Disposition: home with home health SN, PT and OT  If SNF or IPR: Date FOC offered: na  Follow up appointments: PCP   DME needed: walker  Transportation at discharge: sister  IM/IMM Medicare/ letter given: 5/8/24  Is patient a  and connected with VA? na   If yes, was  transfer form completed and VA notified?   Caregiver Contact: Zoe Keating (sister) [phone]   Discharge Caregiver contacted prior to discharge? Patient can contact  Care Conference needed? no  Barriers to discharge:  medically ready    Chart reviewed. Patient discussed in rounds and not ready to discharge.Patient will need home health and walker prior to dc.    CM met with patient and sister to discuss discharge planning. Patient agreed to home health skilled nursing, PT and OT. Patient lives alone and will to accept the services. He would also like a walker. Discussed companies for home health and DME. He has no preference, as long as they are in network and can come to his home.    CM sending referrals to multiple agencies for home health. Waiting for response.    Sent referral to [employer] for 2 wheeled walker. Waiting for response.    CM to follow up.    Patty Deluna, GUSTAVO  Care Manager  [id number]

## 2024-05-13 NOTE — ACP (ADVANCE CARE PLANNING)
Advance Care Planning     Healthcare Decision Maker:   Today, Mr. Moore, whose capacity was not in question, engaged in ACP conversation. He is unmarried and has no children. His parents are . His only living siblings are Zoe Keating and Nazario Briceno. He understands that as his legal nok they are equal primary HCDM should he become too sick to talk with his doctors.    Click here to complete Healthcare Decision Makers including selection of the Healthcare Decision Maker Relationship (ie \"Primary\").  Today we documented Decision Maker(s) consistent with Legal Next of Kin hierarchy.       Mr. Moore also discussed his wishes for a natural death and is considering his code status. He wants to discuss his wishes with his family before completing any forms.     This writer provided Mr. Briceno with a booklet (Your Right to Decide) along with blank DDNR and AMD documents for him to review and discuss with his siblings.      I gave Mr. Briceno a Palliative Medicine business card for reference and let him know our team is available to meet with him and his siblings to have ACP discussions and complete documents.  He expressed understanding.    Thank you for including Palliative team in the care of Mr. Oscar Briceno.    Franchesca Tan, [facility]  Palliative Medicine [phone] ([phone])

## 2024-05-14 VITALS
RESPIRATION RATE: 20 BRPM | WEIGHT: 167.77 LBS | OXYGEN SATURATION: 98 % | HEIGHT: 68 IN | DIASTOLIC BLOOD PRESSURE: 50 MMHG | HEART RATE: 75 BPM | SYSTOLIC BLOOD PRESSURE: 99 MMHG | BODY MASS INDEX: 25.43 KG/M2 | TEMPERATURE: 97.8 F

## 2024-05-14 PROBLEM — Z71.89 ACP (ADVANCE CARE PLANNING): Status: ACTIVE | Noted: 2024-05-14

## 2024-05-14 LAB
ANION GAP SERPL CALC-SCNC: 7 MMOL/L (ref 5–15)
BUN SERPL-MCNC: 30 MG/DL (ref 6–20)
BUN/CREAT SERPL: 13 (ref 12–20)
CALCIUM SERPL-MCNC: 9.6 MG/DL (ref 8.5–10.1)
CHLORIDE SERPL-SCNC: 110 MMOL/L (ref 97–108)
CO2 SERPL-SCNC: 22 MMOL/L (ref 21–32)
CREAT SERPL-MCNC: 2.24 MG/DL (ref 0.7–1.3)
GLUCOSE BLD STRIP.AUTO-MCNC: 92 MG/DL (ref 65–117)
GLUCOSE BLD STRIP.AUTO-MCNC: 95 MG/DL (ref 65–117)
GLUCOSE SERPL-MCNC: 81 MG/DL (ref 65–100)
PHOSPHATE SERPL-MCNC: 2.2 MG/DL (ref 2.6–4.7)
POTASSIUM SERPL-SCNC: 3.8 MMOL/L (ref 3.5–5.1)
SERVICE CMNT-IMP: NORMAL
SERVICE CMNT-IMP: NORMAL
SODIUM SERPL-SCNC: 139 MMOL/L (ref 136–145)
URATE SERPL-MCNC: 1.7 MG/DL (ref 3.5–7.2)

## 2024-05-14 PROCEDURE — 99497 ADVNCD CARE PLAN 30 MIN: CPT | Performed by: NURSE PRACTITIONER

## 2024-05-14 PROCEDURE — 6370000000 HC RX 637 (ALT 250 FOR IP): Performed by: INTERNAL MEDICINE

## 2024-05-14 PROCEDURE — 36415 COLL VENOUS BLD VENIPUNCTURE: CPT

## 2024-05-14 PROCEDURE — 99231 SBSQ HOSP IP/OBS SF/LOW 25: CPT | Performed by: NURSE PRACTITIONER

## 2024-05-14 PROCEDURE — 80048 BASIC METABOLIC PNL TOTAL CA: CPT

## 2024-05-14 PROCEDURE — 84100 ASSAY OF PHOSPHORUS: CPT

## 2024-05-14 PROCEDURE — 82962 GLUCOSE BLOOD TEST: CPT

## 2024-05-14 PROCEDURE — 84550 ASSAY OF BLOOD/URIC ACID: CPT

## 2024-05-14 PROCEDURE — 2580000003 HC RX 258: Performed by: INTERNAL MEDICINE

## 2024-05-14 RX ORDER — BICALUTAMIDE 50 MG/1
50 TABLET, FILM COATED ORAL DAILY
Qty: 30 TABLET | Refills: 0 | Status: SHIPPED | OUTPATIENT
Start: 2024-05-15 | End: 2024-05-14

## 2024-05-14 RX ORDER — LIDOCAINE 4 G/G
1 PATCH TOPICAL DAILY
Qty: 30 EACH | Refills: 0 | Status: SHIPPED | OUTPATIENT
Start: 2024-05-15 | End: 2024-05-14

## 2024-05-14 RX ORDER — LIDOCAINE 4 G/G
1 PATCH TOPICAL DAILY
Qty: 30 EACH | Refills: 0 | Status: SHIPPED | OUTPATIENT
Start: 2024-05-15

## 2024-05-14 RX ORDER — ACETAMINOPHEN 325 MG/1
650 TABLET ORAL EVERY 6 HOURS PRN
Qty: 120 TABLET | Refills: 0 | Status: SHIPPED | OUTPATIENT
Start: 2024-05-14

## 2024-05-14 RX ORDER — ACETAMINOPHEN 325 MG/1
650 TABLET ORAL EVERY 6 HOURS PRN
Qty: 120 TABLET | Refills: 0 | Status: SHIPPED | OUTPATIENT
Start: 2024-05-14 | End: 2024-05-14

## 2024-05-14 RX ORDER — BICALUTAMIDE 50 MG/1
50 TABLET, FILM COATED ORAL DAILY
Qty: 30 TABLET | Refills: 0 | Status: SHIPPED | OUTPATIENT
Start: 2024-05-15

## 2024-05-14 RX ADMIN — SODIUM CHLORIDE: 9 INJECTION, SOLUTION INTRAVENOUS at 02:21

## 2024-05-14 RX ADMIN — BICALUTAMIDE 50 MG: 50 TABLET, FILM COATED ORAL at 08:02

## 2024-05-14 RX ADMIN — METOPROLOL TARTRATE 25 MG: 25 TABLET, FILM COATED ORAL at 08:02

## 2024-05-14 NOTE — DISCHARGE SUMMARY
Discharge Summary    Name: Oscar Briceno  [id number]  YOB: 1950 (Age: 74 y.o.)   Date of Admission: 5/8/2024  Date of Discharge: 5/14/2024  Attending Physician: No att. providers found    Discharge Diagnosis:   Hypercalcemia of malignancy  Metastatic prostate cancer  Hyperuricemia  Tachycardia  QUINCY  Acute hypokalemia  Acute hyperkalemia  Acute hypophosphatemia  Acute urinary retention  L1 pathologic fracture  Sacral ala insufficiency fracture, possible pubic rami fracture  Thrombocytopenia  Essential hypertension  Failure to obtain routine medical screening  Tobacco use  Left shoulder pain      Consultations:  IP CONSULT TO NEPHROLOGY  IP CONSULT TO UROLOGY  IP CONSULT TO PALLIATIVE CARE  IP CONSULT TO ONCOLOGY  IP CONSULT TO ORTHOPEDIC SURGERY  IP CONSULT TO CARDIOLOGY  IP CONSULT TO CASE MANAGEMENT  IP CONSULT TO CASE MANAGEMENT  IP CONSULT TO CASE MANAGEMENT      Brief Admission History/Reason for Admission Per Curtis Pena, DO:   Oscar Briceno is a 74 y.o.  male with PMHx as listed below presenting to the emergency department with complaints of \"pain that would not shake itself\" patient is poor historian with poor health literacy and difficulty expressing symptoms limiting history.  Sounds as though he has been experiencing a few months of persistent musculoskeletal pains with recent development of severe mid back midline pain with radiation around into his abdomen worse on sitting/standing and improved with supine/reclined positioning.  Patient with known prostate cancer followed by Dr. Muniz in urology but sounds as though he has been lost to follow-up and reports he has not received any therapeutics for the past 2 years.  He reports he has not seen his primary care for some time and no longer takes any prescription medications.     Brief Hospital Course by Main Problems:   In the ED, patient afebrile and hemodynamically stable saturating upper

## 2024-05-14 NOTE — PLAN OF CARE
Problem: Discharge Planning  Goal: Discharge to home or other facility with appropriate resources  Outcome: Adequate for Discharge  Flowsheets (Taken 5/13/2024 2243 by Alley Farfan, RN)  Discharge to home or other facility with appropriate resources:   Identify barriers to discharge with patient and caregiver   Arrange for needed discharge resources and transportation as appropriate   Identify discharge learning needs (meds, wound care, etc)   Refer to discharge planning if patient needs post-hospital services based on physician order or complex needs related to functional status, cognitive ability or social support system     Problem: Safety - Adult  Goal: Free from fall injury  Outcome: Adequate for Discharge     Problem: Skin/Tissue Integrity  Goal: Absence of new skin breakdown  Description: 1.  Monitor for areas of redness and/or skin breakdown  2.  Assess vascular access sites hourly  3.  Every 4-6 hours minimum:  Change oxygen saturation probe site  4.  Every 4-6 hours:  If on nasal continuous positive airway pressure, respiratory therapy assess nares and determine need for appliance change or resting period.  Outcome: Adequate for Discharge     Problem: Pain  Goal: Verbalizes/displays adequate comfort level or baseline comfort level  Outcome: Adequate for Discharge  Flowsheets (Taken 5/13/2024 2242 by Alley Farfan, RN)  Verbalizes/displays adequate comfort level or baseline comfort level:   Encourage patient to monitor pain and request assistance   Assess pain using appropriate pain scale   Administer analgesics based on type and severity of pain and evaluate response   Implement non-pharmacological measures as appropriate and evaluate response   Consider cultural and social influences on pain and pain management   Notify Licensed Independent Practitioner if interventions unsuccessful or patient reports new pain     Problem: Infection - Adult  Goal: Absence of infection at discharge  Outcome: Adequate for 
  Problem: Discharge Planning  Goal: Discharge to home or other facility with appropriate resources  Outcome: Progressing  Flowsheets  Taken 5/10/2024 1920 by Gavino Randolph RN  Discharge to home or other facility with appropriate resources: Identify barriers to discharge with patient and caregiver  Taken 5/10/2024 0900 by Marilu Renteria RN  Discharge to home or other facility with appropriate resources:   Identify barriers to discharge with patient and caregiver   Arrange for needed discharge resources and transportation as appropriate   Identify discharge learning needs (meds, wound care, etc)   Refer to discharge planning if patient needs post-hospital services based on physician order or complex needs related to functional status, cognitive ability or social support system     Problem: Safety - Adult  Goal: Free from fall injury  Outcome: Progressing  Flowsheets (Taken 5/10/2024 2048 by Marilu eRnteria RN)  Free From Fall Injury:   Instruct family/caregiver on patient safety   Based on caregiver fall risk screen, instruct family/caregiver to ask for assistance with transferring infant if caregiver noted to have fall risk factors     Problem: Skin/Tissue Integrity  Goal: Absence of new skin breakdown  Outcome: Progressing     Problem: Pain  Goal: Verbalizes/displays adequate comfort level or baseline comfort level  Outcome: Progressing  Flowsheets  Taken 5/10/2024 1920 by Gavino Randolph RN  Verbalizes/displays adequate comfort level or baseline comfort level: Encourage patient to monitor pain and request assistance  Taken 5/10/2024 1410 by Marilu Renteria RN  Verbalizes/displays adequate comfort level or baseline comfort level:   Encourage patient to monitor pain and request assistance   Assess pain using appropriate pain scale   Administer analgesics based on type and severity of pain and evaluate response   Consider cultural and social influences on pain and pain management   Notify 
  Problem: Discharge Planning  Goal: Discharge to home or other facility with appropriate resources  Outcome: Progressing  Flowsheets (Taken 5/11/2024 1912)  Discharge to home or other facility with appropriate resources: Identify barriers to discharge with patient and caregiver     Problem: Safety - Adult  Goal: Free from fall injury  Outcome: Progressing     Problem: Skin/Tissue Integrity  Goal: Absence of new skin breakdown  Outcome: Progressing     Problem: Pain  Goal: Verbalizes/displays adequate comfort level or baseline comfort level  Outcome: Progressing  Flowsheets (Taken 5/11/2024 1915)  Verbalizes/displays adequate comfort level or baseline comfort level: Encourage patient to monitor pain and request assistance     Problem: Infection - Adult  Goal: Absence of infection at discharge  Outcome: Progressing  Flowsheets (Taken 5/11/2024 1912)  Absence of infection at discharge: Assess and monitor for signs and symptoms of infection     Problem: ABCDS Injury Assessment  Goal: Absence of physical injury  Outcome: Progressing     Problem: Occupational Therapy - Adult  Goal: By Discharge: Performs self-care activities at highest level of function for planned discharge setting.  See evaluation for individualized goals.  5/11/2024 1449 by Sofia Dawn, OT  Outcome: Progressing     
  Problem: Discharge Planning  Goal: Discharge to home or other facility with appropriate resources  Outcome: Progressing  Flowsheets (Taken 5/12/2024 2312)  Discharge to home or other facility with appropriate resources:   Identify barriers to discharge with patient and caregiver   Arrange for needed discharge resources and transportation as appropriate   Identify discharge learning needs (meds, wound care, etc)   Refer to discharge planning if patient needs post-hospital services based on physician order or complex needs related to functional status, cognitive ability or social support system     Problem: Safety - Adult  Goal: Free from fall injury  5/12/2024 2315 by Alley Farfan, RN  Outcome: Progressing  5/12/2024 1206 by Andrew Villela RN  Outcome: Progressing     Problem: Skin/Tissue Integrity  Goal: Absence of new skin breakdown  Description: 1.  Monitor for areas of redness and/or skin breakdown  2.  Assess vascular access sites hourly  3.  Every 4-6 hours minimum:  Change oxygen saturation probe site  4.  Every 4-6 hours:  If on nasal continuous positive airway pressure, respiratory therapy assess nares and determine need for appliance change or resting period.  5/12/2024 2315 by Alley Farfan, RN  Outcome: Progressing  5/12/2024 1206 by Andrew Villela RN  Outcome: Progressing     Problem: Pain  Goal: Verbalizes/displays adequate comfort level or baseline comfort level  5/12/2024 2315 by Alley Farfan, RN  Outcome: Progressing  Flowsheets (Taken 5/12/2024 2300)  Verbalizes/displays adequate comfort level or baseline comfort level:   Encourage patient to monitor pain and request assistance   Assess pain using appropriate pain scale   Administer analgesics based on type and severity of pain and evaluate response   Implement non-pharmacological measures as appropriate and evaluate response   Consider cultural and social influences on pain and pain management   Notify Licensed Independent Practitioner if 
  Problem: Occupational Therapy - Adult  Goal: By Discharge: Performs self-care activities at highest level of function for planned discharge setting.  See evaluation for individualized goals.  Description: FUNCTIONAL STATUS PRIOR TO ADMISSION:  Patient was ambulatory using crutches    , ADL Assistance: Independent, Homemaking Assistance: Independent (pt was able to make meals.), Ambulation Assistance: Independent (mod I w/ cane and rollator), Transfer Assistance: Independent, Active : Yes     HOME SUPPORT: Patient lived alone with sister to provide assistance.    Occupational Therapy Goals:  Initiated 5/11/2024  1.  Patient will perform grooming with Set-up and Supervision within 7 day(s).  2.  Patient will perform upper body dressing with Minimal Assist within 7 day(s).  3.  Patient will perform lower body dressing with Minimal Assist within 7 day(s).  4.  Patient will perform toilet transfers with Contact Guard Assist  within 7 day(s).  5.  Patient will perform all aspects of toileting with Minimal Assist within 7 day(s).  6.  Patient will participate in upper extremity therapeutic exercise/activities with Moderate Assist for 10 minutes within 7 day(s).    7.  Patient will utilize energy conservation techniques during functional activities with verbal cues within 7 day(s).   Outcome: Progressing   OCCUPATIONAL THERAPY EVALUATION    Patient: Oscar Briceno (74 y.o. 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    Based on the impairments listed above pt is below his functional baseline. Pt received HOB elevated with no family at bedside on this date. VSS throughout session. Pt expressed concerns with L shoulder not moving since 
  Problem: Physical Therapy - Adult  Goal: By Discharge: Performs mobility at highest level of function for planned discharge setting.  See evaluation for individualized goals.  5/9/2024 1654 by Esther Rajan, PT  Outcome: Not Progressing     Problem: Discharge Planning  Goal: Discharge to home or other facility with appropriate resources  Outcome: Progressing  Flowsheets (Taken 5/9/2024 1930)  Discharge to home or other facility with appropriate resources: Identify barriers to discharge with patient and caregiver     Problem: Safety - Adult  Goal: Free from fall injury  Outcome: Progressing     Problem: Skin/Tissue Integrity  Goal: Absence of new skin breakdown  Outcome: Progressing     Problem: Pain  Goal: Verbalizes/displays adequate comfort level or baseline comfort level  Outcome: Progressing  Flowsheets  Taken 5/9/2024 2320  Verbalizes/displays adequate comfort level or baseline comfort level: Assess pain using appropriate pain scale  Taken 5/9/2024 2015  Verbalizes/displays adequate comfort level or baseline comfort level: Encourage patient to monitor pain and request assistance     Problem: Physical Therapy - Adult  Goal: By Discharge: Performs mobility at highest level of function for planned discharge setting.  See evaluation for individualized goals.  5/9/2024 1654 by Esthre Rajan, PT  Outcome: Not Progressing     
  Problem: Physical Therapy - Adult  Goal: By Discharge: Performs mobility at highest level of function for planned discharge setting.  See evaluation for individualized goals.  Description: FUNCTIONAL STATUS PRIOR TO ADMISSION: Pt mod I with rollator PTA. Limited by pain.    HOME SUPPORT PRIOR TO ADMISSION: Pt sister present for eval. He lives alone, 5 YOLA with B HR. 1 level home. Has SPC and rollator, walk in shower and tub-shower.    Physical Therapy Goals  Initiated 5/9/2024  1.  Patient will move from supine to sit and sit to supine, scoot up and down, and roll side to side in bed with supervision/set-up within 7 day(s).    2.  Patient will perform sit to stand with supervision/set-up within 7 day(s).  3.  Patient will transfer from bed to chair and chair to bed with supervision/set-up using the least restrictive device within 7 day(s).  4.  Patient will ambulate with supervision/set-up for 100 feet with the least restrictive device within 7 day(s).   5.  Patient will ascend/descend 5 stairs with B handrail(s) with contact guard assist within 7 day(s).   Outcome: Progressing   PHYSICAL THERAPY TREATMENT    Patient: Oscar Briceno (74 y.o. 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 Pt received sitting in bedside chair, agreeable to participate in therapy. Family present and supportive at bedside. Pt with good tolerance to increased gait distance today. Continues to require cueing for RW management and step sequencing. Pt benefits from use of RW due to noted unsteadiness. Pt noted to fatigue quickly, requiring seated rest break to recover. Pt continues to perform below baseline, demonstrating impairments with balance, strength, and endurance and would continue to benefit from skilled PT services 
  Problem: Physical Therapy - Adult  Goal: By Discharge: Performs mobility at highest level of function for planned discharge setting.  See evaluation for individualized goals.  Description: FUNCTIONAL STATUS PRIOR TO ADMISSION: Pt mod I with rollator PTA. Limited by pain.    HOME SUPPORT PRIOR TO ADMISSION: Pt sister present for eval. He lives alone, 5 YOLA with B HR. 1 level home. Has SPC and rollator, walk in shower and tub-shower.    Physical Therapy Goals  Initiated 5/9/2024  1.  Patient will move from supine to sit and sit to supine, scoot up and down, and roll side to side in bed with supervision/set-up within 7 day(s).    2.  Patient will perform sit to stand with supervision/set-up within 7 day(s).  3.  Patient will transfer from bed to chair and chair to bed with supervision/set-up using the least restrictive device within 7 day(s).  4.  Patient will ambulate with supervision/set-up for 100 feet with the least restrictive device within 7 day(s).   5.  Patient will ascend/descend 5 stairs with B handrail(s) with contact guard assist within 7 day(s).   Outcome: Progressing   PHYSICAL THERAPY TREATMENT    Patient: Oscar Briceno (74 y.o. 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 Pt received sitting in bedside chair, agreeable to participate in therapy. Family present and supportive at bedside. Spoke with RN who stated that HR has been fluctuating and pt is pending a cardiology consult. Pt presents with mild cognitive impairments with decreased memory and is easily distracted, requiring cueing for redirection to task. Good tolerance to gait trial today. Minor LOB noted however able to independently recover. Benefits greatly from use of RW during mobilization however continues to require cueing 
  Problem: Safety - Adult  Goal: Free from fall injury  Outcome: Progressing     Problem: Skin/Tissue Integrity  Goal: Absence of new skin breakdown  Description: 1.  Monitor for areas of redness and/or skin breakdown  2.  Assess vascular access sites hourly  3.  Every 4-6 hours minimum:  Change oxygen saturation probe site  4.  Every 4-6 hours:  If on nasal continuous positive airway pressure, respiratory therapy assess nares and determine need for appliance change or resting period.  Outcome: Progressing     Problem: Pain  Goal: Verbalizes/displays adequate comfort level or baseline comfort level  Outcome: Progressing  Flowsheets  Taken 5/12/2024 0335 by Gavino Randolph, RN  Verbalizes/displays adequate comfort level or baseline comfort level:   Encourage patient to monitor pain and request assistance   Assess pain using appropriate pain scale  Taken 5/11/2024 0421 by Gavino Randolph, RN  Verbalizes/displays adequate comfort level or baseline comfort level: Encourage patient to monitor pain and request assistance     Problem: Infection - Adult  Goal: Absence of infection at discharge  Outcome: Progressing     
Adult  Goal: Achieves stable or improved neurological status  Recent Flowsheet Documentation  Taken 5/13/2024 0800 by Grant Li RN  Achieves stable or improved neurological status:   Assess for and report changes in neurological status   Initiate measures to prevent increased intracranial pressure   Maintain blood pressure and fluid volume within ordered parameters to optimize cerebral perfusion and minimize risk of hemorrhage     
functionality and self care: Encourage and assist patient to increase activity and self care with guidance from physical therapy/occupational therapy     Problem: Respiratory - Adult  Goal: Achieves optimal ventilation and oxygenation  5/14/2024 1149 by Hien Maddox RN  Outcome: Adequate for Discharge  5/14/2024 1148 by Hien Maddox RN  Outcome: Adequate for Discharge  Flowsheets (Taken 5/13/2024 2243 by Alley Farfan RN)  Achieves optimal ventilation and oxygenation:   Assess for changes in respiratory status   Assess for changes in mentation and behavior   Position to facilitate oxygenation and minimize respiratory effort   Oxygen supplementation based on oxygen saturation or arterial blood gases   Encourage broncho-pulmonary hygiene including cough, deep breathe, incentive spirometry   Assess the need for suctioning and aspirate as needed   Assess and instruct to report shortness of breath or any respiratory difficulty   Respiratory therapy support as indicated     Problem: Anxiety  Goal: Will report anxiety at manageable levels  Description: INTERVENTIONS:  1. Administer medication as ordered  2. Teach and rehearse alternative coping skills  3. Provide emotional support with 1:1 interaction with staff  5/14/2024 1149 by Hien Maddox RN  Outcome: Adequate for Discharge  5/14/2024 1148 by Hien Maddox RN  Outcome: Adequate for Discharge  Flowsheets (Taken 5/13/2024 2243 by Alley Farfan RN)  Will report anxiety at manageable levels:   Administer medication as ordered   Teach and rehearse alternative coping skills   Provide emotional support with 1:1 interaction with staff     Problem: Confusion  Goal: Confusion, delirium, dementia, or psychosis is improved or at baseline  Description: INTERVENTIONS:  1. Assess for possible contributors to thought disturbance, including medications, impaired vision or hearing, underlying metabolic abnormalities, dehydration, psychiatric diagnoses, and notify attending LIP  2. 
much help from another person do you think they would need if they tried?) Total A Lot A Little None   1.  Turning from your back to your side while in a flat bed without using bedrails? []  1 []  2 [x]  3  []  4   2.  Moving from lying on your back to sitting on the side of a flat bed without using bedrails? []  1 []  2 [x]  3  []  4   3.  Moving to and from a bed to a chair (including a wheelchair)? []  1 []  2 [x]  3  []  4   4. Standing up from a chair using your arms (e.g. wheelchair or bedside chair)? []  1 []  2 [x]  3  []  4   5.  Walking in hospital room? []  1 []  2 [x]  3  []  4   6.  Climbing 3-5 steps with a railing? []  1 [x]  2 []  3  []  4     Raw Score: 17/24                            Cutoff score ?171,2,3 had higher odds of discharging home with home health or need of SNF/IPR.    1. Mima Heath, Radha Vargas, Samuel Peacock, Olivia Cole, Denver Banks, J Luis Heath.  Validity of the AM-PAC “6-Clicks” Inpatient Daily Activity and Basic Mobility Short Forms. Physical Therapy Mar 2014, 94 [phone]) 379-391; DOI: 10.2522/ptj.[id number]  2. Ramses HUGHES, Camden J, Prisca J, Thomas J. Association of AM-PAC \"6-Clicks\" Basic Mobility and Daily Activity Scores With Discharge Destination. Phys Ther. 2021 Apr 4;101(4):yuiw462. doi: 10.1093/ptj/sziq731. PMID: 83058414.  3. Niurka CASTRO, Hira D, Barbara S, Salvador K, Isak S. Activity Measure for Post-Acute Care \"6-Clicks\" Basic Mobility Scores Predict Discharge Destination After Acute Care Hospitalization in Select Patient Groups: A Retrospective, Observational Study. Arch Rehabil Res Clin Transl. 2022 Jul 16;4(3):611828. doi: 10.1016/j.arrct.2022.137727. PMID: 82409270; PMCID: [id number].  4. Kumar WATERMAN, Brittanie S, Justus W, Debra P. AM-PAC Short Forms Manual 4.0. Revised 2/2020.

## 2024-05-14 NOTE — PROGRESS NOTES
NAME: Oscar Briceno        :  1950        MRN:  [id number]                    Assessment   :                                               Plan:  QUINCY-resolving  Hypercalcemia-improved  Severe hyperuricemia-better  hypokalemia-controlled  Mild metabolic acidosis-iatrogenic due to normal saline  Thrombocytopenia  Anemia  Metastatic prostate cancer  Urine retention s/p Almendarez     Presented with failure to thrive, weakness, anorexia, hip/back pain Creatinine peaked at 3.2, better  Creatinine  2.2 4 => 2.25 today.  DC normal saline  May need to go home with Almendarez catheter.  Continue Lasix 40 mg p.o. daily on discharge  Pvr 660 ml; now with almendarez     Ca  better         S/p lasix 60 iv   S/p rasburicase 6 mg IV on 5/10/2024    s/p 1 time zometa dose /calcium  slowly better       Subjective:   Out of bed in chair.  States that he is feeling better.  Normal saline 100 mill per hour going on  Eating well and drinking well    Objective:     VITALS:   Last 24hrs VS reviewed since prior progress note. Most recent are:  Vitals:    24 0730   BP: 122/78   Pulse: 76   Resp: 18   Temp: 97.8 °F (36.6 °C)   SpO2: 97%       Intake/Output Summary (Last 24 hours) at 2024 0929  Last data filed at 2024 0910  Gross per 24 hour   Intake 3189.15 ml   Output 1100 ml   Net 2089.15 ml      Telemetry Reviewed:     PHYSICAL EXAM:  General: NAD  Awake alert  Oriented x 3  Not in respiratory distress  Voiding well by means of a Almendarez catheter-urine clear  Mild trace ankle edema    ________________________________________________________________________  Gloria Marx MD     Procedures: see electronic medical records for all procedures/Xrays and details which  were not copied into this note but were reviewed prior to creation of Plan.      LABS:  Recent Labs     24  2214 24  0519   WBC 6.4 6.4   HGB 9.0* 9.2*   HCT 27.8* 28.4* 
                                                                         NAME: Oscar Briceno        :  1950        MRN:  [id number]                    Assessment   :                                               Plan:  QUINCY  Hypercalcemia  hypokalemia  Thrombocytopenia  Anemia  Metastatic prostate cancer  Urine retention     Presented with failure to thrive, weakness, anorexia, hip/back pain Creatinine peaked at 3.2, now 2.9 to 2.65     Pvr 660 ml; now with almendarez     Ca 15.3 to 14.3 to 13.9 with IVF's     Change to NS with with 20 K at 100; s/p 1 liter NS bolus      S/p lasix 60 iv      Continue IVF's and almendarez for now; s/p 1 time zometa dose        Subjective:     Chief Complaint:  oob in chair. Sister visiting. No complaint. Almendarez.    Review of Systems:    Symptom Y/N Comments  Symptom Y/N Comments   Fever/Chills    Chest Pain     Poor Appetite    Edema     Cough    Abdominal Pain     Sputum    Joint Pain     SOB/TORRES    Pruritis/Rash     Nausea/vomit    Tolerating PT/OT     Diarrhea    Tolerating Diet     Constipation    Other       Could not obtain due to:      Objective:     VITALS:   Last 24hrs VS reviewed since prior progress note. Most recent are:  Vitals:    05/10/24 0255   BP:    Pulse: 84   Resp: 20   Temp:    SpO2:        Intake/Output Summary (Last 24 hours) at 5/10/2024 0611  Last data filed at 2024 2325  Gross per 24 hour   Intake 155 ml   Output 1950 ml   Net -1795 ml      Telemetry Reviewed:     PHYSICAL EXAM:  General: NAD      Lab Data Reviewed: (see below)    Medications Reviewed: (see below)    PMH/ reviewed - no change compared to H&P  ________________________________________________________________________  Care Plan discussed with:  Patient     Family      RN     Care Manager                    Consultant:          Comments   >50% of visit spent in counseling and coordination of care       ________________________________________________________________________  Radha 
                                                                         NAME: Oscar Briceno        :  1950        MRN:  [id number]                    Assessment   :                                               Plan:  QUINCY  Hypercalcemia  hypokalemia  Thrombocytopenia  Anemia  Metastatic prostate cancer  Urine retention     Presented with failure to thrive, weakness, anorexia, hip/back pain Creatinine peaked at 3.2, better     Pvr 660 ml; now with almendarez     Ca 15.3 to 14.3 to 13.9 with IVF's     Change to NS with with 20 K at 100; s/p 1 liter NS bolus      S/p lasix 60 iv      Continue IVF's and almendarez for now; s/p 1 time zometa dose /calcium  slowly better       Subjective:   resting    Objective:     VITALS:   Last 24hrs VS reviewed since prior progress note. Most recent are:  Vitals:    24 0300   BP: 105/72   Pulse: 90   Resp: 18   Temp: 98 °F (36.7 °C)   SpO2:        Intake/Output Summary (Last 24 hours) at 2024 0852  Last data filed at 2024 0612  Gross per 24 hour   Intake 1524.97 ml   Output 2120 ml   Net -595.03 ml        Telemetry Reviewed:     PHYSICAL EXAM:  General: NAD         ________________________________________________________________________  Marisa Bass MD     Procedures: see electronic medical records for all procedures/Xrays and details which  were not copied into this note but were reviewed prior to creation of Plan.      LABS:  Recent Labs     24  0548 24  0122   WBC 7.7 6.6   HGB 9.9* 9.4*   HCT 30.1* 28.4*   PLT 70* 65*       Recent Labs     24  1421 24  2138 05/10/24  0626 05/10/24  1706 24  0122   *   < > 132* 132* 132*   K 3.8   < > 4.0 4.3 4.8   CL 96*   < > 99 99 103   CO2 23   < > 22 24 23   BUN 64*   < > 58* 53* 47*   MG 2.0  --   --   --   --     < > = values in this interval not displayed.       Recent Labs     24  1421   GLOB 4.0       No results for input(s): \"INR\", \"APTT\" in the last 72 
      Hospitalist Progress Note    NAME:   Oscar Briceno   : 1950   MRN: [id number]     Date/Time: 2024 1:27 PM  Patient PCP: Tanner Ojeda MD    Estimated discharge date: 24  Barriers: improvement in electrolytes, heart rate, and oncology clearance      Assessment / Plan:  Hypercalcemia of malignancy  Metastatic prostate cancer  S/ iv lasix  Continue iv fluids- decrease the rate from 200 to 100 cc/ hour  S/p zometa 1 dose on - calcium now trending down  Appreciate nephrology consult  Appreciate urology consult  Appreciate oncology and palliative care consult  Started on casodex by oncology on this admission    Hyperuricemia-  Likely contributing to renal failure  Huge tumor burden may be lysing come cell.  S/p rasburicase  6mg iv on 5/10/24    tachycardia;  Will consult cardiology    QUINCY: Improving creatinine    Acute hypokalemia:  Resolved    Acute Hyperkalemia:  Resolved    Acute hypophosphatemia:  Will replete phosphorus      Acute urinary retention:  Diana placed on this admission  Outpatient urology follow up for voiding trial  Diana leaking as per RN       L1 pathologic fracture  Oxycodone as needed for pain       Thrombocytopenia  Likely secondary to malignancy-trend CBC, may need to hold DVT prophylaxis if decreases further     Essential hypertension  Monitor blood pressure and address as needed  As needed labetalol for hypertensive urgency/emergency     Failure to obtain routine medical screenings  Patient with poor health literacy and misunderstanding of role of preventative screenings and primary care and managing/coordinating care.    Outpatient PCP follow up     Tobacco use  Smoking cessation counselling.     Left shoulder pain  ROM restricted  Likely s/s bony metastasis          Medical Decision Making:   I personally reviewed labs:BMP sodium improving from 136, creatinine slightly trended down from 2.3 to 2.2, calcium 10.8, CBC- hb stable at 9.2, platelet stable at 67. 
      Hospitalist Progress Note    NAME:   Oscar Briceno   : 1950   MRN: [id number]     Date/Time: 5/10/2024 1:51 PM  Patient PCP: Tanner Ojeda MD    Estimated discharge date: 24  Barriers: improvement in electrolytes, creatinine, nephrology and oncology clearance      Assessment / Plan:  Hypercalcemia of malignancy  Metastatic prostate cancer  S/ iv lasix  Continue iv fluids- decrease the rate from 200 to 100 cc/ hour  S/p zometa 1 dose on   Appreciate nephrology consult  Appreciate urology consult  Appreciate oncology and palliative care consult  Started on casodex by oncology on this admission    QUINCY: Improving creatinine    Acute hypokalemia:  Resolved    Acute Hyperkalemia:  resolved      Acute urinary retention:  Diana placed on this admission  Outpatient urology follow up for voiding trial       L1 pathologic fracture  Oxycodone as needed for pain       Thrombocytopenia  Likely secondary to malignancy-trend CBC, may need to hold DVT prophylaxis if decreases further     Essential hypertension  Monitor blood pressure and address as needed  As needed labetalol for hypertensive urgency/emergency     Failure to obtain routine medical screenings  Patient with poor health literacy and misunderstanding of role of preventative screenings and primary care and managing/coordinating care.    Outpatient PCP follow up     Tobacco use  Smoking cessation counselling.             Medical Decision Making:   I personally reviewed labs:BMP sodium improved from 127 to 132, calcium 14, creatinine trending down to 2.55, low glucose- will check POC glucose  I personally reviewed imaging:CT chest< MRI lumbar spine  I personally reviewed EKG:  Toxic drug monitoring:   Discussed case with: patient, RN, CM, IDR        Code Status: full  DVT Prophylaxis: heparin  GI Prophylaxis:    Subjective:     Chief Complaint / Reason for Physician Visit  Patient was sitting up in the chair, denies any new complaints. 
      Hospitalist Progress Note    NAME:   Oscar Briceno   : 1950   MRN: [id number]     Date/Time: 2024 2:04 PM  Patient PCP: Tanner Ojeda MD    Estimated discharge date: 24  Barriers: improvement in electrolytes, creatinine, nephrology and oncology clearance      Assessment / Plan:  Hypercalcemia of malignancy  Metastatic prostate cancer  S/ iv lasix  Continue iv fluids- decrease the rate from 200 to 100 cc/ hour  S/p zometa 1 dose on - calcium now trending down  Appreciate nephrology consult  Appreciate urology consult  Appreciate oncology and palliative care consult  Started on casodex by oncology on this admission    Hyperuricemia-  Likely contributing to renal failure  Huge tumor burden may be lysing come cell.  S/p rasburicase  6mg iv on 5/10/24    QUINCY: Improving creatinine    Acute hypokalemia:  Resolved    Acute Hyperkalemia:  resolved      Acute urinary retention:  Diana placed on this admission  Outpatient urology follow up for voiding trial  Diana leaking as per RN       L1 pathologic fracture  Oxycodone as needed for pain       Thrombocytopenia  Likely secondary to malignancy-trend CBC, may need to hold DVT prophylaxis if decreases further     Essential hypertension  Monitor blood pressure and address as needed  As needed labetalol for hypertensive urgency/emergency     Failure to obtain routine medical screenings  Patient with poor health literacy and misunderstanding of role of preventative screenings and primary care and managing/coordinating care.    Outpatient PCP follow up     Tobacco use  Smoking cessation counselling.     Left shoulder pain  ROM restricted  Likely s/s bony metastasis  Will get Xray left shoulder        Medical Decision Making:   I personally reviewed labs:BMP sodium stable- 132, creatinine trending stable at 2.4, calcium 12, CBC- hb stable 9.4, platelet 65  I personally reviewed imaging:  I personally reviewed EKG:  Toxic drug monitoring:   Discussed 
      Hospitalist Progress Note    NAME:   Oscar Briceno   : 1950   MRN: [id number]     Date/Time: 2024 2:46 PM  Patient PCP: Tanner Ojeda MD    Estimated discharge date: 24  Barriers: improvement in electrolytes, creatinine,       Assessment / Plan:  Hypercalcemia of malignancy  Metastatic prostate cancer  S/ iv lasix  Continue iv fluids- decrease the rate from 200 to 100 cc/ hour  Appreciate nephrology consult  Appreciate urology consult  Palliative consult pending  Will consult oncology    QUINCY: Improving creatinine    Acute hypokalemia:  Resolved    Acute Hyperkalemia:  Will monitor      Acute urinary retention:  Diana placed on this admission  Outpatient urology follow up for voiding trial       L1 pathologic fracture  Oxycodone as needed for pain  States pain is better control today     Thrombocytopenia  Likely secondary to malignancy-trend CBC, may need to hold DVT prophylaxis if decreases further     Essential hypertension  Monitor blood pressure and address as needed  As needed labetalol for hypertensive urgency/emergency     Failure to obtain routine medical screenings  Patient with poor health literacy and misunderstanding of role of preventative screenings and primary care and managing/coordinating care.    Outpatient PCP follow up     Tobacco use  Smoking cessation counselling.             Medical Decision Making:   I personally reviewed labs:cbc- platelet count trended down to 70- will monitor, BMP: calcium improved to 13.5, creatinine improved to 2.71, elevated potassium 5.3- will monitor, sodium trending down from 131 to 127- will monitor  I personally reviewed imaging:  I personally reviewed EKG:  Toxic drug monitoring:   Discussed case with: patient, RN, CM, IDR        Code Status: full  DVT Prophylaxis: heparin  GI Prophylaxis:    Subjective:     Chief Complaint / Reason for Physician Visit  Patient states that his back ache is better and he would like to ambulate. 
    1900hrs  Bedside and Verbal shift change report given to gavino post and Gideon (oncoming nurse) by Andrew (offgoing nurse). Report included the following information ED SBAR, Intake/Output, MAR, Cardiac Rhythm NSR, Alarm Parameters, and Quality Measures. .     End of Shift Note    Bedside shift change report given to Andrew (oncoming nurse) by Gideon  and Gavino Post RN (offgoing nurse).  Report included the following information SBAR, Intake/Output, MAR, Cardiac Rhythm NST, Alarm Parameters , and Quality Measures    Shift worked:  [id number]     Shift summary and any significant changes:     -slightly confused with frequent waking up and wanting to move around,tends to pull lance almendarez feeding poorly although he tried to take orange juice with some encouragement.     Concerns for physician to address:  Poor feeding     Zone phone for oncoming shift:          Activity:     Number times ambulated in hallways past shift: 0  Number of times OOB to chair past shift: 0    Cardiac:   Cardiac Monitoring: Yes           Access:  Current line(s): PIV     Genitourinary:   Urinary status: almendarez    Respiratory:      Chronic home O2 use?: NO  Incentive spirometer at bedside: NO       GI:     Current diet:  ADULT ORAL NUTRITION SUPPLEMENT; Breakfast, Dinner; Clear Liquid Oral Supplement  ADULT DIET; Regular  Passing flatus: YES  Tolerating current diet: YES       Pain Management:   Patient states pain is manageable on current regimen: YES    Skin:     Interventions: float heels, increase time out of bed, PT/OT consult, and nutritional support    Patient Safety:  Fall Score:    Interventions: bed/chair alarm, gripper socks, and tele-sitter       Length of Stay:  Expected LOS: 5  Actual LOS: 4      Gavino Post RN                            
    Patient: Oscar Briceno MRN: [id number]  SSN: [id number]    YOB: 1950  Age: 74 y.o.  Sex: male        ADMITTED: 2024 to Princess Torres MD by Curtis Pena DO for Hypercalcemia of malignancy [E83.52]  POD# * No surgery found *     Oscar Briceno is doing fair today.  No complaints of pain.  No gross hematuria.  Urine yellow and clear in Almendarez catheter tubing and Almendarez catheter bag.    Labs 5/10/2024 noting sodium 132, creatinine 2.55 from 2.65 from 2.86; WBC 7.7, hemoglobin 9.9 stable.    Vitals: Temp (24hrs), Av.8 °F (36.6 °C), Min:97.7 °F (36.5 °C), Max:98 °F (36.7 °C)    Blood pressure 118/66, pulse 84, temperature 97.8 °F (36.6 °C), temperature source Oral, resp. rate 20, height 1.727 m (5' 8\"), weight 70.4 kg (155 lb 3.3 oz), SpO2 95 %.    Intake and Output:   1901 - 05/10 0700  In: 355 [P.O.:340; I.V.:15]  Out: 2900 [Urine:2900]  No intake/output data recorded.  MANUEL Output lats 24 hrs: No data found.   MANUEL Output last 8 hrs: No data found.  BM over last 24 hrs: No data found.    Physical Exam  General: NAD, pleasant  Respiratory: no distress, breathing easily, room air  Abdomen: soft, no distention; non-tender to palpation  : no CVA tenderness, emptying clear yellow urine in Almendarez catheter tubing and bag  Neuro: Appropriate, no focal neurological deficits  Skin: warm, dry  Extremities: moves all, full ROM    Labs:  CBC:   Lab Results   Component Value Date/Time    WBC 7.7 2024 05:48 AM    HCT 30.1 2024 05:48 AM    PLT 70 2024 05:48 AM     BMP:   Lab Results   Component Value Date/Time     05/10/2024 06:26 AM    K 4.0 05/10/2024 06:26 AM    CL 99 05/10/2024 06:26 AM    CO2 22 05/10/2024 06:26 AM    BUN 58 05/10/2024 06:26 AM     Assessment/Plan:   Metastatic prostate cancer, urinary retention, QUINCY   - Lost to FU with VU. Last seen in office . Needs oncology consultation   - No hydro on imaging.  - Trend creatinine  - Continue with almendarez 
  Hematology Oncology Progress Note         Follow up for:  prostate cancer    Chart notes reviewed since last visit.    Case discussed with following: nursing    Patient complains of the following: no specific complaints - seems better    Additional concerns noted by the staff:     Patient Vitals for the past 24 hrs:   BP Temp Temp src Pulse Resp SpO2 Weight   05/14/24 0730 122/78 97.8 °F (36.6 °C) Oral 76 18 97 % --   05/14/24 0557 -- -- -- -- -- -- 76.1 kg (167 lb 12.3 oz)   05/14/24 0244 110/68 97.7 °F (36.5 °C) Oral 78 18 99 % --   05/14/24 0212 102/64 97.7 °F (36.5 °C) Oral 89 18 99 % --   05/13/24 2223 111/72 98.1 °F (36.7 °C) Oral 72 16 98 % --   05/13/24 2019 114/66 -- -- 88 -- -- --   05/13/24 1912 (!) 116/57 98.4 °F (36.9 °C) Oral 81 16 99 % --   05/13/24 1853 -- -- -- (!) 127 -- -- --   05/13/24 1851 -- -- -- 89 -- -- --   05/13/24 1848 -- -- -- (!) 108 -- -- --   05/13/24 1720 -- -- -- (!) 132 -- -- --   05/13/24 1520 -- -- -- 69 -- 98 % --   05/13/24 1450 (!) 115/92 97.4 °F (36.3 °C) Oral (!) 106 19 97 % --   05/13/24 1210 -- -- -- 82 -- 99 % --   05/13/24 1120 136/79 -- -- (!) 136 -- -- --   05/13/24 1100 112/65 -- -- (!) 137 -- 97 % --   05/13/24 1055 112/65 97.3 °F (36.3 °C) Oral (!) 137 18 97 % --   05/13/24 1018 123/65 -- -- (!) 141 -- -- --         Review of Systems: negative for 11 organ systems except as noted.     Physical Examination:  Gen NAD  Resp no distress  Psych normal      Labs:  Recent Results (from the past 24 hour(s))   POCT Glucose    Collection Time: 05/13/24 12:02 PM   Result Value Ref Range    POC Glucose 108 65 - 117 mg/dL    Performed by: Mariana Linkiam PCT    POCT Glucose    Collection Time: 05/13/24  6:02 PM   Result Value Ref Range    POC Glucose 137 (H) 65 - 117 mg/dL    Performed by: Mariana Linkiam PCT    POCT Glucose    Collection Time: 05/13/24 11:31 PM   Result Value Ref Range    POC Glucose 131 (H) 65 - 117 mg/dL    Performed by: Trey Cervantes PCT    Uric Acid    
  Hematology Oncology Progress Note         Follow up for:  prostate cancer    Chart notes reviewed since last visit.    Case discussed with following: nursing    Patient complains of the following: no specific complaints - still weak, no c/o pain this am    Additional concerns noted by the staff:     Patient Vitals for the past 24 hrs:   BP Temp Temp src Pulse Resp SpO2 Weight   05/11/24 1053 (!) 101/54 97.6 °F (36.4 °C) Oral 84 18 95 % --   05/11/24 0933 107/72 -- -- 87 -- 92 % --   05/11/24 0333 -- -- -- -- -- -- 69.8 kg (153 lb 14.1 oz)   05/11/24 0300 105/72 98 °F (36.7 °C) Oral 90 18 -- --   05/10/24 2300 (!) 100/55 98.5 °F (36.9 °C) Oral 88 20 92 % --   05/10/24 2245 -- -- -- 83 -- -- --   05/10/24 2230 -- -- -- 81 -- -- --   05/10/24 2215 -- -- -- 93 -- -- --   05/10/24 2200 -- -- -- 95 -- -- --   05/10/24 2145 -- -- -- 87 -- -- --   05/10/24 2130 -- -- -- 90 -- -- --   05/10/24 2115 -- -- -- 87 -- -- --   05/10/24 1942 -- -- -- -- 18 -- --   05/10/24 1940 -- -- -- 92 -- -- --   05/10/24 1935 -- -- -- 97 -- -- --   05/10/24 1930 -- -- -- 88 -- -- --   05/10/24 1925 -- -- -- 96 -- -- --   05/10/24 1920 113/63 97.4 °F (36.3 °C) Oral 93 -- 96 % --   05/10/24 1915 -- -- -- 98 -- -- --   05/10/24 1912 -- -- -- -- 18 -- --   05/10/24 1606 107/67 98 °F (36.7 °C) Oral 94 18 -- --   05/10/24 1605 -- -- -- -- -- 93 % --   05/10/24 1410 -- -- -- -- 16 -- --         Review of Systems: negative for 11 organ systems except as noted.     Physical Examination:  Constitutional Alert, cooperative, oriented. Mood and affect appropriate. Appears close to chronological age. Well nourished. Well developed.   Head Normocephalic; no scars   Eyes Conjunctivae and sclerae are clear and without icterus. Pupils are round       Neck Supple without masses or thyromegaly. No jugular venous distension.   Hematologic/Lymphatic No petechiae or purpura.  No tender or palpable lymph nodes noted   Respiratory Lungs are clear to auscultation 
  Hematology Oncology Progress Note         Follow up for:  prostate cancer    Chart notes reviewed since last visit.    Case discussed with following: nursing    Patient complains of the following: no specific complaints - still weak, no c/o pain this am, other than wanting to work with pt    Additional concerns noted by the staff:     Patient Vitals for the past 24 hrs:   BP Temp Temp src Pulse Resp SpO2 Weight   05/13/24 0727 (!) 140/77 98.8 °F (37.1 °C) Oral (!) 121 16 99 % --   05/13/24 0605 -- -- -- (!) 107 -- -- --   05/13/24 0600 -- -- -- (!) 111 -- -- --   05/13/24 0545 -- -- -- (!) 136 -- -- --   05/13/24 0544 -- -- -- 85 -- -- --   05/13/24 0537 112/74 98 °F (36.7 °C) Oral (!) 109 18 96 % --   05/13/24 0530 -- -- -- (!) 137 -- -- --   05/13/24 0515 -- -- -- (!) 139 -- -- --   05/13/24 0512 -- -- -- (!) 113 -- -- 74.2 kg (163 lb 9.3 oz)   05/13/24 0500 -- -- -- (!) 151 -- -- --   05/13/24 0445 -- -- -- (!) 153 -- -- --   05/13/24 0430 -- -- -- (!) 131 -- -- --   05/13/24 0415 -- -- -- (!) 131 -- -- --   05/13/24 0400 -- -- -- 77 -- -- --   05/13/24 0345 -- -- -- (!) 105 -- -- --   05/13/24 0330 -- -- -- (!) 119 -- -- --   05/13/24 0315 -- -- -- (!) 104 -- -- --   05/13/24 0300 -- -- -- 86 -- -- --   05/13/24 0245 -- -- -- (!) 117 -- -- --   05/13/24 0230 -- -- -- (!) 117 -- -- --   05/13/24 0215 -- -- -- (!) 138 -- -- --   05/13/24 0200 -- -- -- 89 -- -- --   05/13/24 0145 -- -- -- (!) 101 -- -- --   05/13/24 0130 -- -- -- (!) 110 -- -- --   05/13/24 0115 -- -- -- 79 -- -- --   05/13/24 0100 -- -- -- (!) 137 -- -- --   05/13/24 0045 -- -- -- (!) 136 -- -- --   05/13/24 0035 -- -- -- (!) 133 -- -- --   05/13/24 0034 -- -- -- 84 -- -- --   05/13/24 0033 -- -- -- (!) 135 -- -- --   05/13/24 0032 -- -- -- 91 -- -- --   05/13/24 0031 -- -- -- (!) 132 -- -- --   05/13/24 0025 120/71 97.4 °F (36.3 °C) Oral 91 16 96 % --   05/12/24 2330 -- -- -- (!) 124 -- -- --   05/12/24 2315 -- -- -- (!) 119 -- -- --   05/12/24 
  Physician Progress Note      PATIENT:               MARISSA SOLIS  CSN #:                  [id number]  :                       1950  ADMIT DATE:       2024 4:24 PM  DISCH DATE:  RESPONDING  PROVIDER #:        Princess Torres MD          QUERY TEXT:    Pt admitted with hypercalcemia of malignancy and has severe malnutrition   documented in RD PN . Please specify type of malnutrition with   documentation in the medical record.    The medical record reflects the following:  Risk Factors: Metastatic prostate cancer    Clinical Indicators:  RD PN 24- Severe malnutrition related to catabolic illness, inadequate   protein-energy intake as evidenced by Criteria as identified in malnutrition   assessment  Malnutrition Status:  Severe malnutrition (24 1028)  Context:  Chronic Illness  Findings of the 6 clinical characteristics of malnutrition:  Energy Intake:  75% or less estimated energy requirements for 1 month or   longer  Weight Loss:  Unable to assess  Body Fat Loss:  Severe body fat loss Orbital, Triceps  Muscle Mass Loss:  Severe muscle mass loss Clavicles (pectoralis & deltoids),   Temples (temporalis)  Fluid Accumulation:  No significant fluid accumulation  Last Wt: 69.8 kg (153 lb 14.1 oz), BMI: 23.40 kg/m?    Treatment: receiving RD-Modify Current Diet, Start Oral Nutrition Supplement    ASPEN Criteria:    https://aspenjournals.onlinelibrary.robert.com/doi/full/10.1177/664344634041180  5    Shi Remy, RN, BSN, CRCR  [address] [place] [address]  [email or web address]  Options provided:  -- Severe Malnutrition  -- Other - I will add my own diagnosis  -- Disagree - Not applicable / Not valid  -- Disagree - Clinically unable to determine / Unknown  -- Refer to Clinical Documentation Reviewer    PROVIDER RESPONSE TEXT:    This patient has severe malnutrition.    Query created by: Shi Remy on 2024 12:57 PM      Electronically signed by:  Princess Torres MD 
  Physician Progress Note      PATIENT:               MARISSA SOLIS  CSN #:                  [id number]  :                       1950  ADMIT DATE:       2024 4:24 PM  DISCH DATE:  RESPONDING  PROVIDER #:        Princess Torres MD        QUERY TEXT:    Type of Anemia: Please provide further specificity, if known.    Clinical indicators include: anemia, cancer, hgb, hct, tibc, fe, chemo,   bleeding  Options provided:  -- Anemia due to acute blood loss  -- Anemia due to chronic blood loss  -- Anemia due to iron deficiency  -- Anemia due to postoperative blood loss  -- Anemia due to chronic disease  -- Other - I will add my own diagnosis  -- Disagree - Not applicable / Not valid  -- Disagree - Clinically Unable to determine / Unknown        PROVIDER RESPONSE TEXT:    The patient has anemia due to chronic disease.      Electronically signed by:  Princess Torres MD 2024 4:06 PM          
2235 Notified NP Dunbar that pt labs show that calcium is now 15 which is down from last result but wanted to make provider aware. NP placed order for repeat calcium in AM. Will continue to trend BMP Q4 per orders.     2323 Notified NP that pt has not void since admission and bladder scan shows that he is retaining 661ml. Pt has no urge to urinate. NP placed order for straight cath.     0319 Notified NP that pt K 3.3 and Calcium 14.7.  Orders placed for PO K repletion.    0459 Pt able to void 100ml but still retaining 404ml. Will continue to encourage to void.    0653 Notified NP Dunbar that Calcium is now 14.3 (still trending down).    0705 Pt bladder scanned again and pt now retaining >500ml. Per order placed by MD Pena almendarez is to be placed if BS > 300ml.    0745 Almendarez order placed. Almendarez placed by GUSTAVO Larson and GUSTAVO Saunders.         End of Shift Note    Bedside shift change report given to GUSTAVO Saunders (oncoming nurse) by Marsha Benson RN (offgoing nurse).  Report included the following information SBAR, Kardex, ED Summary, Intake/Output, and MAR    Shift worked:  Night      Shift summary and any significant changes:    Pt unable to tolerate eating, became nauseous and vomited. PRN zofran given. Trending Q4 BMP, Calcium trending down.   Pt has urinary retention, Straight cath x1 overnight. Urology and nephro consults to be called in AM.   Concerns for physician to address:       Zone phone for oncoming shift:          Activity:     Number times ambulated in hallways past shift: 0  Number of times OOB to chair past shift: 0    Cardiac:   Cardiac Monitoring: Yes           Access:  Current line(s): PIV     Genitourinary:   Urinary status: voiding and straight cath    Respiratory:      Chronic home O2 use?: NO  Incentive spirometer at bedside: NO       GI:     Current diet:  ADULT DIET; Regular; Low Fat/Low Chol/High Fiber/KIMBERLYN  ADULT ORAL NUTRITION SUPPLEMENT; HS Snack; Renal Oral Supplement  Passing 
Bedside and Verbal shift change report given to Cody (oncoming nurse) by Marilu (offgoing nurse). Report included the following information Nurse Handoff Report, ED SBAR, Intake/Output, MAR, Recent Results, Cardiac Rhythm  , Alarm Parameters, Quality Measures, and Neuro Assessment.     2115  HYPOGLYCEMIC EPISODE DOCUMENTATION    Patient with hypoglycemic episode(s) at 2115 (time) on 5/10/2024 (date).    BG value(s) pre-treatment 64    Was patient symptomatic? [] yes, [x] no  Patient was treated with the following rescue medications/treatments: [] D50                [] Glucose tablets                [] Glucagon                [] 4oz juice                [] 6oz reg soda                [] 8oz low fat milk  Patient was unable to tolerate 4 oz of juice after taking an oz hence the reason to give 125cc of  D10 ,    BG value post-treatment: 107  Once BG treated and value greater than 80mg/dl, pt was provided with the following:  [x] snack  [] meal  Name of MD notified:  Bean Terrazas NP    The following orders were received:.......    End of Shift Note    Bedside shift change report given to Andrew (oncoming nurse) by Gideon  and Gavino Randolph RN (offgoing nurse).  Report included the following information SBAR, Intake/Output, MAR, Recent Results, Cardiac Rhythm  , and Alarm Parameters     Shift worked:  [id number]     Shift summary and any significant changes:     Looks calm,not able to feed well orally making him to be hypoglycemic to one extent.BG-64  Blood sugars have normalised for now     Concerns for physician to address:  Poor appetite     [facility]          Activity:     Number times ambulated in hallways past shift:   Number of times OOB to chair past shift: 0    Cardiac:   Cardiac Monitoring: Yes           Access:  Current line(s): PIV     Genitourinary:   Urinary status: almendarez    Respiratory:      Chronic home O2 use?: NO  Incentive spirometer at bedside: 
Bedside and Verbal shift change report given to Dana (oncoming nurse) by Andrew (offgoing nurse). Report included the following information Nurse Handoff Report, ED SBAR, Intake/Output, MAR, Cardiac Rhythm  , Alarm Parameters, Quality Measures, and Neuro Assessment.     
Bedside shift change report given to GUSTAVO Correia (oncoming nurse) by GUSTAVO Meneses and GUSTAVO Mancia (offgoing nurse). Report included the following information Nurse Handoff Report.     
Chart reviewed for PCP hospital follow up. Patient's XIMENA is now 5/13/24. CMA can schedule the appt when patient is clinically ready to discharge. [facility] placed [facility] information AVS for patient resource. Pending patient discharge.   
Comprehensive Nutrition Assessment    Type and Reason for Visit:  Initial, Positive Nutrition Screen    Nutrition Recommendations/Plan:   Liberalize to a regular diet  Add ensure clear BID  Obtain current weight     Malnutrition Assessment:  Malnutrition Status:  Severe malnutrition (05/09/24 1028)    Context:  Chronic Illness     Findings of the 6 clinical characteristics of malnutrition:  Energy Intake:  75% or less estimated energy requirements for 1 month or longer  Weight Loss:  Unable to assess     Body Fat Loss:  Severe body fat loss Orbital, Triceps   Muscle Mass Loss:  Severe muscle mass loss Clavicles (pectoralis & deltoids), Temples (temporalis)  Fluid Accumulation:  No significant fluid accumulation     Strength:  Not Performed    Nutrition Assessment:    Patient medically noted for metastatic prostate cancer, hypercalcemia, QUINCY, L1 pathological fracture, and HTN. MST referral received. Patient with no current or recent weights noted on chart review. He is unsure of current or UBW but suspects he has lost 20#. Evidence of muscle and fat wasting noted. He reports a decreased appetite, early satiety, and nausea/vomiting recently preventing him from eating. He ate a little bit of breakfast this morning but didn't want to overdo it. He has tried ensure and reports not liking or tolerating it. Agreeable to ensure clear for now. Will liberalize to a regular diet. Encourage intake of meals as tolerated.     Nutrition Related Findings:    Na 131, K+ 3.4, BG 73, Calcium 14.3   Medications reviewed   Wound Type: None       Current Nutrition Intake & Therapies:    Average Meal Intake: 26-50%     ADULT DIET; Regular; Low Fat/Low Chol/High Fiber/KIMBERLYN  ADULT ORAL NUTRITION SUPPLEMENT; HS Snack; Renal Oral Supplement    Anthropometric Measures:  Height: 172.7 cm (5' 8\")  Ideal Body Weight (IBW): 154 lbs (70 kg)       Current Body Weight:  ,   IBW.    Current BMI (kg/m2):                            BMI Categories:  
End of Shift Note    Bedside shift change report given to Alley (oncoming nurse) by TVEIN FITCH RN (offgoing nurse).  Report included the following information SBAR, Kardex, Procedure Summary, Intake/Output, MAR, and Recent Results    Shift worked:  7a-7p     Shift summary and any significant changes:     Patient's heart rate elevated this morning, Cardiology consult placed and 25 mg of Metoprolol started for twice a day  Patient also given sodium phosphate infusion per MD orders  Ortho consult placed for fractures noted on MRI    Concerns for physician to address:       Zone phone for oncoming shift:   [phone]       Activity:     Number times ambulated in hallways past shift: 1  Number of times OOB to chair past shift: 2    Cardiac:   Cardiac Monitoring: Yes           Access:  Current line(s): PIV     Genitourinary:   Urinary status: almendarez    Respiratory:      Chronic home O2 use?: NO  Incentive spirometer at bedside: YES       GI:     Current diet:  ADULT ORAL NUTRITION SUPPLEMENT; Breakfast, Dinner; Clear Liquid Oral Supplement  ADULT DIET; Regular  DIET ONE TIME MESSAGE;  Passing flatus: YES  Tolerating current diet: YES       Pain Management:   Patient states pain is manageable on current regimen: YES    Skin:     Interventions: turn team, increase time out of bed, PT/OT consult, internal/external urinary devices, and nutritional support    Patient Safety:  Fall Score:    Interventions: bed/chair alarm, assistive device (walker, cane. etc), gripper socks, pt to call before getting OOB, and stay with me (per policy)       Length of Stay:  Expected LOS: 6  Actual LOS: 5      TEVIN FITCH RN                           
End of Shift Note    Bedside shift change report given to GEORGIANA Duran (oncoming nurse) by ROSALIA MORENO RN (offgoing nurse).  Report included the following information SBAR, Kardex, ED Summary, Procedure Summary, Intake/Output, MAR, Recent Results, and Cardiac Rhythm NSR, PAC.s    Shift worked:  7-7     Shift summary and any significant changes:     Pt had a good day, gave pain med's which helped . Gave Zofran pt pt not eating. Pt can hardly move his L arm and it starts at shoulder. Pt moves well walked in miranda with PT.  No BM yet gave fiber lax .without results.  Remains with high calcium level.     Concerns for physician to address:  Please check left arm and needs something to stimulate appetite.   Zone phone for oncoming shift:          Activity:     Number times ambulated in hallways past shift: 1  Number of times OOB to chair past shift: 2    Cardiac:   Cardiac Monitoring: Yes           Access:  Current line(s): PIV     Genitourinary:   Urinary status: almendarez    Respiratory:      Chronic home O2 use?: NO  Incentive spirometer at bedside: NO       GI:     Current diet:  ADULT ORAL NUTRITION SUPPLEMENT; Breakfast, Dinner; Clear Liquid Oral Supplement  ADULT DIET; Regular  Passing flatus: NO  Tolerating current diet: NO       Pain Management:   Patient states pain is manageable on current regimen: NO    Skin:     Interventions: specialty bed, float heels, increase time out of bed, PT/OT consult, internal/external urinary devices, and nutritional support    Patient Safety:  Fall Score:    Interventions: bed/chair alarm, assistive device (walker, cane. etc), gripper socks, pt to call before getting OOB, and stay with me (per policy)       Length of Stay:  Expected LOS: 5  Actual LOS: 2      ROSALIA MORENO, RN                            
End of Shift Note    Bedside shift change report given to GUSTAVO Hager (oncoming nurse) by Masood Villela RN (offgoing nurse).  Report included the following information SBAR    Shift worked:  7a-7p     Shift summary and any significant changes:     Patient continues with poor appetite. No other significant changes.     Concerns for physician to address:  none     Zone phone for oncoming shift:   [phone]       Activity:     Number times ambulated in hallways past shift: 0  Number of times OOB to chair past shift: 2    Cardiac:   Cardiac Monitoring: Yes           Access:  Current line(s): PIV     Genitourinary:   Urinary status: almendarez    Respiratory:      Chronic home O2 use?: NO  Incentive spirometer at bedside: NO       GI:     Current diet:  ADULT ORAL NUTRITION SUPPLEMENT; Breakfast, Dinner; Clear Liquid Oral Supplement  ADULT DIET; Regular  DIET ONE TIME MESSAGE;  Passing flatus: YES  Tolerating current diet: NO       Pain Management:   Patient states pain is manageable on current regimen: YES    Skin:     Interventions: float heels, increase time out of bed, internal/external urinary devices, and nutritional support    Patient Safety:  Fall Score:    Interventions: bed/chair alarm, gripper socks, pt to call before getting OOB, stay with me (per policy), and sitter at bedside       Length of Stay:  Expected LOS: 5  Actual LOS: 4      Masood Villela RN                            
End of Shift Note    Bedside shift change report given to Grant CHEN (oncoming nurse) by Alley Farfan RN (offgoing nurse).  Report included the following information SBAR, Intake/Output, MAR, Recent Results, and Cardiac Rhythm nonsustained atrial tach  vs sinus tach    Shift worked:  7p-7a     Shift summary and any significant changes:    Frequent rhythm changes observed on telemetry- at times, NSR to sinus tach, then HR increases to 120-130s briefly, possibly a flutter 2:1? Pt reports \"My heart beats fast sometimes\" but cannot answer if he has seen a cardiologist in past. Reported to nocturnist- EKG completed, 1g Mg repleted, stat labs completed.     -150 during activity.    Pt did not sleep well, restless and at times disoriented to situation and time.   Pt declined offer of tylenol.    Mild hematuria, almendarez draining. No leaking observed from almendarez this shift.     Oriented in AM. CHG bath completed.     Concerns for physician to address:  See above.      Zone phone for oncoming shift:   [phone]       Activity:     Number times ambulated in hallways past shift: 0  Number of times OOB to chair past shift: 1    Cardiac:   Cardiac Monitoring: Yes           Access:  Current line(s): PIV     Genitourinary:   Urinary status: almendarez    Respiratory:      Chronic home O2 use?: NO  Incentive spirometer at bedside: NO       GI:     Current diet:  ADULT ORAL NUTRITION SUPPLEMENT; Breakfast, Dinner; Clear Liquid Oral Supplement  ADULT DIET; Regular  DIET ONE TIME MESSAGE;  Passing flatus: YES  Tolerating current diet: NO       Pain Management:   Patient states pain is manageable on current regimen: YES    Skin:     Interventions: float heels, increase time out of bed, PT/OT consult, internal/external urinary devices, and nutritional support    Patient Safety:  Fall Score:    Interventions: bed/chair alarm, assistive device (walker, cane. etc), gripper socks, pt to call before getting OOB, and stay with me (per policy)   
End of Shift Note    Bedside shift change report given to Hien CHEN (oncoming nurse) by Alley Farfan RN (offgoing nurse).  Report included the following information SBAR, Intake/Output, MAR, Recent Results, and Cardiac Rhythm NSR/atrial tach    Shift worked:  7p-7a     Shift summary and any significant changes:    2015: Administered PRN tylenol and melatonin to alleviate generalized discomfort and promote sleep.    2124: Pt attempts to exit bed, bed alarming. At bedside, pt requests to be \"untangled\" from medical equipment. Reminded pt to use call bell. Repositioned pt. Bed alarm reactivated.     2255: Pt sleeping.     2335: Pt attempting to exit bed, reoriented and repositioned pt. Bed alarm active.    0135: Pt accidentally dislodged PIV. Placed new PIV, restarted IVF per orders. CHG bath completed.    0440: Bed alarming, pt sitting on side of bed pulling at almendarez and PIV. Reorientation provided. Pt calm, cooperative. Remains disoriented. Bed alarm active.        Concerns for physician to address: Developing trace LE edema. DC IVF?   Voiding trial or DC c almendarez? (Urology signed off)     Zone phone for oncoming shift:   [phone]       Activity:     Number times ambulated in hallways past shift: 0  Number of times OOB to chair past shift: 0    Cardiac:   Cardiac Monitoring: Yes           Access:  Current line(s): PIV     Genitourinary:   Urinary status: almendarez    Respiratory:      Chronic home O2 use?: NO  Incentive spirometer at bedside: NO       GI:     Current diet:  ADULT ORAL NUTRITION SUPPLEMENT; Breakfast, Dinner; Clear Liquid Oral Supplement  ADULT DIET; Regular  DIET ONE TIME MESSAGE;  Passing flatus: YES  Tolerating current diet: NO       Pain Management:   Patient states pain is manageable on current regimen: YES    Skin:     Interventions: float heels, increase time out of bed, PT/OT consult, internal/external urinary devices, and nutritional support    Patient Safety:  Fall Score:    Interventions: bed/chair 
End of Shift Note    Bedside shift change report given to Marilu (oncoming nurse) by Felicita Randolph RN (offgoing nurse).  Report included the following information SBAR, Intake/Output, MAR, Cardiac Rhythm  , Alarm Parameters , and Quality Measures    Shift worked:  [id number]     Shift summary and any significant changes:     Well settled although so far although alittle bit restless overnight.poor apetite still persists.Has got poor oral intake     Concerns for physician to address:  Poor oral intake     Zone phone for oncoming shift:          Activity:     Number times ambulated in hallways past shift: 1  Number of times OOB to chair past shift: 1    Cardiac:   Cardiac Monitoring: Yes           Access:  Current line(s): PIV     Genitourinary:   Urinary status: almendarez    Respiratory:      Chronic home O2 use?: NO  Incentive spirometer at bedside: NO       GI:     Current diet:  ADULT ORAL NUTRITION SUPPLEMENT; Breakfast, Dinner; Clear Liquid Oral Supplement  ADULT DIET; Regular  Passing flatus: NO  Tolerating current diet: YES       Pain Management:   Patient states pain is manageable on current regimen: YES    Skin:     Interventions: float heels, increase time out of bed, PT/OT consult, and nutritional support    Patient Safety:  Fall Score:    Interventions: bed/chair alarm, assistive device (walker, cane. etc), gripper socks, and gait belt       Length of Stay:  Expected LOS: 2  Actual LOS: 2      Gavino Randolph RN                            
Hospital follow-up PCP transitional care appointment has been scheduled with Dr. Ojeda on 6/5/24 at 1410. This is the first available appt due to limited provider availability. PCP office does not offer alternate provider option for hospital follow up. Pending patient discharge. Emi Hernandez, Care Management Assistant  
Nursing contacted Nocturnist/cross cover provider and notified patient lab result of ca 15, down from 15.3, per notes hypercalcemia of malignancy, asymptomatic. Received 60mg iv lasix on admit and 200ml/hr CMIVF. VSS. No other reported concerns at this time. Ordered repeat calcium level in the AM, appears lasis was given at 2121 and lab drawn at 2138, so not likely the treatment that was started had time to take any effect. Will defer further evaluation/management to the day shift primary care team. Patient denies any further complaints or concerns. No acute distress reported. Nursing to notify Hospitalist for further/continued concerns. Will remain available overnight for further concerns if nursing/patient needs.     Update  2328 nurse reported pt not voided since admit, has no urge to void, bladder scan 661 ml. Ordered straight cath x1 now. No other concerns reported.     Update  0330 nurse reported ca 14.7 trending down slowly. Has repeat labs already ordered to trend. K 3.3. kcl 20meq po x1 now ordered. Pt remains asymptomatic. No other concerns reported. Vss.    Update nurse msg me back to confirm kcl as 20 and 40meq ordered. I d/c the 20meq and will keep the 40meq as dr espana ordered. Nurse aware.    Non-billable note.    
Nursing contacted Nocturnist/cross cover provider via non-urgent messaging system [employer] and notified patient hr tachy, appears on telemetry per nurse like an aflutter. No other concerns reported. No acute distress reported. No other information provided by nurse, VSS. Ordered stat EKG- pending, mag added to am labs. Last k was 4.7 today and last mag 2.0 on 5/8, not on diuretics, hr going into a possible PAF and then coming out several times, when appears in rhythm on telemetry monitor going 120-130s, asymptomatic otherwise. 1gm iv mag ordered x1. EKG appears upon my initial review appears aflutter w/RBBB- pending final cards review. Nurse reported bp sustaining and wnl, hr going in and out of aflutter with rate 80s controlled in NSR on telemetry and then up to 120-130s in aflutter. Stat cbc, bmp, mag, phos- pending, these will be done prior to repletion of mag as above, nurse to give mag as above. Consider replete lytes to keep on high side of normal. D/w nurse if going into aflutter and sustains would consider cardizem push vs gtt pending on how pt responds. At this time nurse wcm and will notify when labs result. Will defer further evaluation/management to the day shift primary attending care team. Patient denies any further complaints or concerns. Nursing to notify Hospitalist for further/continued concerns. Will remain available overnight for further concerns if nursing/patient needs. Please note, there are RRT systems in this hospital in place that if nursing has acute or critical patient condition change or concern, this is to help facilitate and notify that patient needs immediate bedside evaluation by a provider. Would consider cards consult in the am vs outpt- will defer timing to dayshift, unless other intervention given overnight for HR, then would consider cards consult in the am per d/w nurse.    Non-billable note.       
ORTHOPAEDIC CONSULT NOTE    Subjective:     Date of Consultation:  May 13, 2024      Oscar Briceno is a 74 y.o. male who is being seen for sacral insufficiency fracture and possible left pubic rami insufficiency fractures.  Patient was admitted on 5/8 for hypercalcemia as well as metastatic lesions from prostate cancer.  Our services were consulted today to evaluate the patient for the left sacral ala insufficiency fracture as well as the possible pubic rami fractures.  Patient today states that he is not having any pain in his tailbone/sacrum.  He denies any complaints in regard to this area.  He denies any recent injuries or falls.  He denies any other complaints at this time.    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   Prior to Admission medications    Medication Sig Start Date End Date Taking? Authorizing Provider   valsartan (DIOVAN) 160 MG tablet TAKE 1 TABLET BY MOUTH EVERY DAY  Patient not taking: Reported on 5/8/2024 8/4/23   Tanner Ojeda MD   amLODIPine (NORVASC) 10 MG tablet Take 1 tablet by mouth daily  Patient not taking: Reported on 5/8/2024 7/6/21   Automatic Reconciliation, Ar     Current Facility-Administered Medications   Medication Dose Route Frequency    metoprolol tartrate (LOPRESSOR) tablet 25 mg  25 mg Oral BID    sodium phosphate 15 mmol in 
Occupational Therapy   Orders received and medical record reviewed.  Attempting to initiate OT Evaluation, however, pt is just leaving the floor for bone scan.  Nurse reports that pt has c/o weakness in L dominant UE and  per nurse, pt reports he thinks it is from using crutches.  (Pt also uses RW).  Pt's sister was present.  Will defer and continue to follow.    
Palliative Medicine  Patient Name: Oscar Briceno  YOB: 1950  MRN: [id number]  Age: 74 y.o.  Gender: male    Date of Initial Consult: 5/9/2024  Date of Service: 5/14/2024  Time: 10:17 AM  Provider: NESTOR Cristobal NP  Hospital Day: 7  Admit Date: 5/8/2024  Referring Provider: Curtis Pena DO       Reasons for Consultation:  Goals of Care and Overwhelming Symptoms    HISTORY OF PRESENT ILLNESS (HPI):   Oscar Briceno is a 74 y.o. male with a past medical history of prostate cancer, HTN, who was admitted on 5/8/2024 from home with a diagnosis of prostate cancer. Pt presented to ED with c/o \"pain that would not shake itself.\"  Per ED notes, pt is poor historian, poor health literacy, difficulty expressing symptoms.  Has had persistent musculoskeletal pain with recent development of severe, midline, mid back pain that radiates to his abdomen, exacerbated with sitting/standing, some relief with lying supine/reclined position.  Pt with known prostate cancer, followed by , lost to follow-up, has not received any treatment for the past 2 years, has not been seen by PCP, nno longer takes any prescription medications, smokes \"just under 1 ppd.\"   CT abdomen pelvis demonstrating diffuse pulmonary and osseous metastatic disease process with enlarged prostate. Labs demonstrate: Sodium 131, potassium 3.8, BUN 64, creatinine 3.16 (previous 0.76 in 2022), calcium 15.3 (albumin 3.4), total bilirubin 0.7, ALT 14, AST 38, alkaline phosphatase 86, WBC 10.0, hemoglobin 11.4, platelets 89.   5/9: sitting up in bed, AAO.  Reports pain as tolerable, 5-7/10.  MRI: IMPRESSION:  Diffuse bone marrow replacement with metastases without definite epidural  component.  Small congenital distal spinal canal.  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    5/10: sitting up in recliner at bedside, denies pain.  Has received 3 doses of oxycodone today.  Calcium slowly coming down, today 13.8.    5/14: lying in 
Palliative Medicine  Patient Name: Oscar Briceno  YOB: 1950  MRN: [id number]  Age: 74 y.o.  Gender: male    Date of Initial Consult: 5/9/2024  Date of Service: 5/10/2024  Time: 2:28 PM  Provider: NESTOR Cristobal NP  Hospital Day: 3  Admit Date: 5/8/2024  Referring Provider: Curtis Pena DO       Reasons for Consultation:  Goals of Care and Overwhelming Symptoms    HISTORY OF PRESENT ILLNESS (HPI):   Oscar Briceno is a 74 y.o. male with a past medical history of prostate cancer, HTN, who was admitted on 5/8/2024 from home with a diagnosis of prostate cancer. Pt presented to ED with c/o \"pain that would not shake itself.\"  Per ED notes, pt is poor historian, poor health literacy, difficulty expressing symptoms.  Has had persistent musculoskeletal pain with recent development of severe, midline, mid back pain that radiates to his abdomen, exacerbated with sitting/standing, some relief with lying supine/reclined position.  Pt with known prostate cancer, followed by , lost to follow-up, has not received any treatment for the past 2 years, has not been seen by PCP, nno longer takes any prescription medications, smokes \"just under 1 ppd.\"   CT abdomen pelvis demonstrating diffuse pulmonary and osseous metastatic disease process with enlarged prostate. Labs demonstrate: Sodium 131, potassium 3.8, BUN 64, creatinine 3.16 (previous 0.76 in 2022), calcium 15.3 (albumin 3.4), total bilirubin 0.7, ALT 14, AST 38, alkaline phosphatase 86, WBC 10.0, hemoglobin 11.4, platelets 89.   5/9: sitting up in bed, AAO.  Reports pain as tolerable, 5-7/10.  MRI: IMPRESSION:  Diffuse bone marrow replacement with metastases without definite epidural  component.  Small congenital distal spinal canal.  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    5/10: sitting up in recliner at bedside, denies pain.  Has received 3 doses of oxycodone today.  Calcium slowly coming down, today 13.8.    Psychosocial: 
Palliative Medicine  Per Mabel Gallardo NP: Oscar Briceno is a 74 y.o. male with a past medical history of prostate cancer, HTN, who was admitted on 2024 from home with a diagnosis of prostate cancer. Pt presented to ED with c/o \"pain that would not shake itself.\"  Per ED notes, pt is poor historian, poor health literacy, difficulty expressing symptoms.  Has had persistent musculoskeletal pain with recent development of severe, midline, mid back pain that radiates to his abdomen, exacerbated with sitting/standing, some relief with lying supine/reclined position.  Pt with known prostate cancer, followed by , lost to follow-up, has not received any treatment for the past 2 years, has not been seen by PCP, nno longer takes any prescription medications, smokes \"just under 1 ppd.\"  CT abdomen pelvis demonstrating diffuse pulmonary and osseous metastatic disease process with enlarged prostate. Labs demonstrate: Sodium 131, potassium 3.8, BUN 64, creatinine 3.16 (previous 0.76 in ), calcium 15.3 (albumin 3.4), total bilirubin 0.7, ALT 14, AST 38, alkaline phosphatase 86, WBC 10.0, hemoglobin 11.4, platelets 89.   5/9: sitting up in bed, AAO.  Reports pain as tolerable, 5-10.  MRI: IMPRESSION:  Diffuse bone marrow replacement with metastases without definite epidural  component.  Small congenital distal spinal canal.  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     510: sitting up in recliner at bedside, denies pain.  Has received 3 doses of oxycodone today.  Calcium slowly coming down, today 13.8.     Code Status: Full Code    [facility]/11/2024     2:20 PM   Demographics   Marital Status Single     Patient / Family Encounter Documentation    Participants (names): Oscar Briceno, Franchesca Tan, KAYLEE    Narrative: Palliative SW reviewed patient's chart and introduced self to him with nurse at bedside. Patient was alert and pleasant and receptive to encounter.  This writer 
EKG:  Toxic drug monitoring:   Discussed case with: patient, RN        Code Status: full  DVT Prophylaxis: heparin  GI Prophylaxis:    Subjective:     Chief Complaint / Reason for Physician Visit  Patient denies any new complaints. States that he would like to go home on discharge, his sister should be able to help him at home..     Objective:     VITALS:   Last 24hrs VS reviewed since prior progress note. Most recent are:  Patient Vitals for the past 24 hrs:   BP Temp Temp src Pulse Resp SpO2 Weight   05/12/24 1120 113/71 97.8 °F (36.6 °C) Oral (!) 103 19 98 % --   05/12/24 0715 133/75 98.2 °F (36.8 °C) Oral (!) 116 16 92 % --   05/12/24 0632 -- -- -- -- -- -- 74.4 kg (164 lb 0.4 oz)   05/12/24 0335 (!) 109/57 97.4 °F (36.3 °C) Oral 78 18 94 % --   05/12/24 0330 -- -- -- (!) 104 -- 93 % --   05/12/24 0325 -- -- -- 92 -- 98 % --   05/12/24 0320 -- -- -- (!) 106 -- 93 % --   05/12/24 0315 -- -- -- (!) 110 -- -- --   05/12/24 0310 -- -- -- (!) 0 -- -- --   05/12/24 0305 -- -- -- (!) 108 -- -- --   05/12/24 0300 -- -- -- 73 -- -- --   05/11/24 2315 -- -- -- (!) 102 -- 92 % --   05/11/24 2308 (!) 105/59 96.9 °F (36.1 °C) Oral (!) 107 -- 95 % --   05/11/24 2300 -- -- -- (!) 102 -- -- --   05/11/24 2245 109/68 98 °F (36.7 °C) Oral 84 -- -- --   05/11/24 2230 -- -- -- 91 -- -- --   05/11/24 2215 -- -- -- 82 -- -- --   05/11/24 2200 -- -- -- (!) 102 -- -- --   05/11/24 1930 -- -- -- 87 -- 95 % --   05/11/24 1915 106/66 98.4 °F (36.9 °C) Oral 90 18 96 % --   05/11/24 1912 -- -- -- 82 -- -- --   05/11/24 1900 -- -- -- (!) 103 -- -- --   05/11/24 1845 -- -- -- (!) 105 -- -- --   05/11/24 1830 -- -- -- 92 -- -- --   05/11/24 1815 -- -- -- (!) 107 -- -- --   05/11/24 1445 (!) 96/50 97.8 °F (36.6 °C) Oral (!) 110 16 94 % --           Intake/Output Summary (Last 24 hours) at 5/12/2024 1147  Last data filed at 5/12/2024 0633  Gross per 24 hour   Intake 420 ml   Output 1100 ml   Net -680 ml          I had a face to face encounter 
clear and without icterus. Pupils are round   ENMT Sinuses are nontender.  No oral exudates, ulcers, masses, thrush or mucositis. Oropharynx clear.  Tongue normal.   Neck Supple without masses or thyromegaly. No jugular venous distension.   Hematologic/Lymphatic No petechiae or purpura.  No tender or palpable lymph nodes noted   Respiratory Lungs are clear to auscultation without rhonchi or wheezing.   Cardiovascular Regular rate and rhythm of heart without murmurs, gallops or rubs.   Chest / Line Site Chest is symmetric with no chest wall deformities.   Abdomen Non-tender, non-distended, no masses, ascites or hepatosplenomegaly. Good bowel sounds.    Musculoskeletal No tenderness or swelling, normal range of motion without obvious weakness.   Extremities No visible deformities, no cyanosis, clubbing or edema.    Skin No rashes, scars, or lesions suggestive of malignancy. No petechiae, purpura, or ecchymoses. No excoriations.    Neurologic No sensory or motor deficits noted but not specifically tested.    Psychiatric Alert. Coherent speech. Verbalizes understanding of our discussions today.       Labs:  Recent Results (from the past 24 hour(s))   Basic Metabolic Panel    Collection Time: 05/09/24  1:42 PM   Result Value Ref Range    Sodium 130 (L) 136 - 145 mmol/L    Potassium 3.9 3.5 - 5.1 mmol/L    Chloride 96 (L) 97 - 108 mmol/L    CO2 27 21 - 32 mmol/L    Anion Gap 7 5 - 15 mmol/L    Glucose 94 65 - 100 mg/dL    BUN 58 (H) 6 - 20 MG/DL    Creatinine 2.86 (H) 0.70 - 1.30 MG/DL    Bun/Cre Ratio 20 12 - 20      Est, Glom Filt Rate 23 (L) >60 ml/min/1.73m2    Calcium 13.8 (HH) 8.5 - 10.1 MG/DL   Basic Metabolic Panel    Collection Time: 05/09/24 11:21 PM   Result Value Ref Range    Sodium 132 (L) 136 - 145 mmol/L    Potassium 3.8 3.5 - 5.1 mmol/L    Chloride 98 97 - 108 mmol/L    CO2 23 21 - 32 mmol/L    Anion Gap 11 5 - 15 mmol/L    Glucose 72 65 - 100 mg/dL    BUN 56 (H) 6 - 20 MG/DL    Creatinine 2.65 (H) 0.70 - 
results for input(s): \"PH\", \"PCO2\", \"PO2\" in the last 72 hours.  No results for input(s): \"CPK\", \"CKMB\", \"TROPONINI\" in the last 72 hours.  No components found for: \"GLPOC\"  @[email or web address]@    MEDICATIONS:  Current Facility-Administered Medications   Medication Dose Route Frequency    0.9 % sodium chloride infusion   IntraVENous Continuous    glucose chewable tablet 16 g  4 tablet Oral PRN    dextrose bolus 10% 125 mL  125 mL IntraVENous PRN    Or    dextrose bolus 10% 250 mL  250 mL IntraVENous PRN    glucagon injection 1 mg  1 mg SubCUTAneous PRN    dextrose 10 % infusion   IntraVENous Continuous PRN    lidocaine 4 % external patch 1 patch  1 patch TransDERmal Daily    bicalutamide (CASODEX) chemo tablet 50 mg  50 mg Oral Daily    oxyCODONE (ROXICODONE) immediate release tablet 5 mg  5 mg Oral Q4H PRN    sodium chloride flush 0.9 % injection 5-40 mL  5-40 mL IntraVENous 2 times per day    sodium chloride flush 0.9 % injection 5-40 mL  5-40 mL IntraVENous PRN    0.9 % sodium chloride infusion   IntraVENous PRN    ondansetron (ZOFRAN-ODT) disintegrating tablet 4 mg  4 mg Oral Q8H PRN    Or    ondansetron (ZOFRAN) injection 4 mg  4 mg IntraVENous Q6H PRN    polyethylene glycol (GLYCOLAX) packet 17 g  17 g Oral Daily PRN    acetaminophen (TYLENOL) tablet 650 mg  650 mg Oral Q6H PRN    Or    acetaminophen (TYLENOL) suppository 650 mg  650 mg Rectal Q6H PRN    melatonin tablet 3 mg  3 mg Oral Nightly PRN    heparin (porcine) injection 5,000 Units  5,000 Units SubCUTAneous 3 times per day    labetalol (NORMODYNE;TRANDATE) injection syringe 5 mg  5 mg IntraVENous Q6H PRN     
650 mg Oral Q6H PRN    Or    acetaminophen (TYLENOL) suppository 650 mg  650 mg Rectal Q6H PRN    melatonin tablet 3 mg  3 mg Oral Nightly PRN    [Held by provider] heparin (porcine) injection 5,000 Units  5,000 Units SubCUTAneous 3 times per day    labetalol (NORMODYNE;TRANDATE) injection syringe 5 mg  5 mg IntraVENous Q6H PRN     
firmagon,  lupron, eligard at least until we know the disease in his spine does not put him at risk for cord compression. Have discussed this with him and he appears to be agreeable to being treated, including radiation therapy should it come to that. He has undergone MRI of pelvis and lumbar spine, bone scan and Ct chest without contrast for staging which confirms a fair amount of disease but fortunately no cord compression thus far. Await the dictations of the MRI of pelvis      Hyperuricemia  improved UA 4.6 - likely contributing to renal failure. Huge tumor burden so may be lysing some of his cells. given rasburicase to lower urgently. Do NOT give allopurinol for now because it will prevent rasburicase from working but may be option later.     Hypercalcemia of malignancy, CA+ 10.7 today..improved. continue isotonic saline to restore euvolemia  and zoledronic acid 4 mg IV.  The maximum  effect of zometa occurs in 2 to 4 days.            Jurgen Mata MD

## 2024-05-14 NOTE — ACP (ADVANCE CARE PLANNING)
Advance Care Planning      Palliative Medicine Provider (MD/NP)  Advance Care Planning (ACP) Conversation      Date of Conversation: 05/14/24  The patient and/or authorized decision maker consented to a voluntary Advance Care Planning conversation.   Individuals present for the conversation:   Patient with decision making capacity and Sister Zoe    Legal Healthcare Agent(s):    Primary Decision Maker: Zoe Keating - Brother/Sister - [phone]    Primary Decision Maker: [place] - Brother/Sister    ACP documents available in EMR prior to discussion:  -None    Primary Palliative Diagnosis(es):  Prostate cancer  Pathological fractures  Hypercalcemia of malignancy  Thrombocytopenia     Conversation Summary:  I reviewed DDNR with pt, and he stated he was not ready for DNR and wants to remain FULL CODE. Also counseled pt about the benefit of having an AMD on file; he stated that his LNOK are his sister Zoe Keating and Nazario Briceno, who are listed in his contacts. Counseled pt about the importance of talking with his siblings about his wishes so that in the event he ends up needing them to make decisions on his behalf they will know what to do. Also important to keep them updated on any changes in his wishes, as over time, if his disease process worsens and is going to cause his death in the near future and there is no more treatment available, he might not want CPR at end of life. Pt and sister stated that they understood. Pt declined completing AMD. (See ACP note)     FULL CODE  Full medical interventions    Resuscitation Status:    Code Status: Full Code    Outcomes / Completed Documentation:  An explanation of advance directives and their importance was provided and the following forms completed:    -No new documents completed.    If new document completed, original was provided to patient and/or family member.    Copy was placed for scanning into the [facility] EMR.      I spent 25 minutes providing

## 2024-05-14 NOTE — CARE COORDINATION
Clear from CM standpoint for discharge      Transition of Care Plan:    RUR: 16% (High RUR)  Prior Level of Functioning: independent  Disposition: home with [facility] health [facility] Phone: [phone]   If SNF or IPR: Date FOC offered: na  Follow up appointments: Dr. Ojeda on 6/5/24 at 1410   DME needed: walker, [address] [place], [place] Phone: [phone]    Transportation at discharge: Zoe (Sister) [phone]    IM/IMM Medicare/ letter given: 5/14/24  Is patient a  and connected with [facility]? na   If yes, was [place] transfer form completed and [facility] notified?   Caregiver Contact: Zoe Keating (sister) [phone]   Discharge Caregiver contacted prior to discharge? Patient can contact  Care Conference needed? no  Barriers to discharge:  none       05/14/24 1149   Services At/After Discharge   Transition of Care Consult (CM Consult) Home Health;DME/Supply Assistance  ([facility] Phone: [phone] and [address] [place] Phone: [phone])   Internal Home Health No   Reason Outside Agency Chosen Script used patient chose alternate agency   Services At/After Discharge Home Health;DME  ([facility] Phone: [phone] and [address] [place] Phone: [phone])   Mode of Transport at Discharge Other (see comment)   Confirm Follow Up Transport Self   Condition of Participation: Discharge Planning   The Plan for Transition of Care is related to the following treatment goals: Coordinate necessary home health services or medical equipment for the patient's continued care at home.   The Patient and/or Patient Representative was provided with a Choice of Provider? Patient   The Patient and/Or Patient Representative agree with the Discharge Plan? Yes   Freedom of Choice list was

## 2024-05-14 NOTE — CONSULTS
New Urology Consult Note    Patient: Oscar Briceno MRN: [id number]  SSN: [id number]    YOB: 1950  Age: 74 y.o.  Sex: male            Plan:     Metastatic prostate cancer, urinary retention, QUINCY   - Lost to FU with [facility]. Last seen in office 2022. Needs oncology consultation   - Cr 2.16 on arrival, now 2.88. Appears to have been 0.76 in 2022. No hydro on imaging. Straight cathed x1 and almendarez placed 5/9 both for volumes of nearly 600ml.   - Continue with almendarez in place at this time with consideration of VT next week. Flomax.   - Recommend Spinal MRI to further evaluate L1 compression fracture and if metastatic site/or if can be radiated.  - Nephrology consulted by primary team; agree  - Patient may benefit from PT intervention as well as dietary counseling  - Strongly recommend consultation for oncology  - He has follow up June 2024 Dr. Arleen Muniz  - Following    D/w Dr. Josué Trinidad    Thank you for this consult. Please contact [facility] with any further questions/concerns.    History of Present Illness:     Oscar Briceno is seen in consultation for reasons noted above at the request of Princess Torres MD. Admitted to hospital for Hypercalcemia of malignancy. Reports 20 lb unintentional weight loss over past couple months.    This is a 74 y.o. male with a history of HTN, metastatic prostate cancer ( known to lymph and bones- In the scapula and humerus bilaterally, ribs bilaterally, sternum, calvarium, throughout the cervical, thoracic, and lumbar spine, throughout the pelvis, sacrum, and in the femur bilaterally.). He was diagnosed with prostate cancer on 07/23/2020. Pretreatment PSA was 6959.0ng/ml. His clinical stage was T4c. Final pathologic staging was T4c  N1  M1b.     PROSTATE CANCER TREATMENT HISTORY:  First Treatment: Androgen deprivation therapy - 07/30/2020   Second Treatment: Casodex -  -- 10/06/2020  end  Third Treatment: Zytiga  - 10/06/2020      Seen 
                    [facility] EP/ ARRHYTHMIA/ CARDIOLOGY CONSULT      EP Cardiology consult requested by Princess Torres MD for ***  PCP:  Tanner Ojeda MD    Primary cardiologist: ***     Electrophysiology Service  5/14/2024     Assessment:   ***  ***  ***    Plan:   ***  ***      []    High complexity decision making was performed  []    Patient is at high-risk of decompensation with multiple organ involvement       HPI:  Oscar Briceno is a 74 y.o. male with history of:  ***     No Known Allergies  Past Medical History:   Diagnosis Date    Hypertension      No past surgical history on file.  Family History   Problem Relation Age of Onset    No Known Problems Mother     No Known Problems Father      Social History     Tobacco Use    Smoking status: Every Day     Current packs/day: 0.50     Types: Cigarettes    Smokeless tobacco: Never   Substance Use Topics    Alcohol use: Yes    Drug use: Never         ROS:       10 point review of systems completed by myself and is negative or not relevant unless noted in the HPI. ***     Objective     OBJECTIVE:  BP (!) 99/50   Pulse 75   Temp 97.8 °F (36.6 °C) (Oral)   Resp 20   Ht 1.727 m (5' 8\")   Wt 76.1 kg (167 lb 12.3 oz)   SpO2 98%   PF (!) 18 L/min   BMI 25.51 kg/m²      GEN:                ***Well developed, well nourished  NECK:     ***No jugular venous distension, no thyromegaly, no carotid bruit  CV:      ***, no murmurs, No S3/S4  RESP:     ***Clear to ascultation  EXT:    ***No edema  NEURO/PSY: ***Alert, oriented, normal mood/affect    I/O last 3 completed shifts:  In: 6588.8 [P.O.:1130; I.V.:5208.8; IV Piggyback:250]  Out: 3000 [Urine:3000]  Patient Vitals for the past 24 hrs:   BP Temp Temp src Pulse Resp SpO2 Weight   05/14/24 1056 (!) 99/50 97.8 °F (36.6 °C) Oral 75 20 98 % --   05/14/24 0730 122/78 97.8 °F (36.6 °C) Oral 76 18 97 % --   05/14/24 0557 -- -- -- -- -- -- 76.1 kg (167 lb 12.3 oz)   05/14/24 0244 110/68 97.7 °F (36.5 °C) Oral 78 18 99 
    Consultation Note    NAME: Oscar Briceno   :  1950   MRN:  [id number]     Date/Time:  2024 9:45 AM    I have been asked to see this patient by Dr. Pena  for advice/opinion re: QUINCY.     Assessment :    Plan:  QUINCY  Hypercalcemia  hypokalemia  Thrombocytopenia  Anemia  Metastatic prostate cancer  Urine retention    Presented with failure to thrive, weakness, anorexia, hip/back pain Creatinine 3.2 to now 2.9    Pvr 660 ml; agree with almendarez    Ca 15.3 to 14.3 with IVF's    Getting 1/2 NS with with 20 K at 100; s/p 1 liter NS bolus     S/p lasix 60 iv today    Continue IVF's and almendarez for now (if GFR continues to improve, give bisphosphonate             CT:    1. Diffuse pulmonary and osseous metastatic disease.  2. Enlarged prostate.  3. No evidence of genitourinary stone or hydronephrosis.    Subjective:   CHIEF COMPLAINT:  \"quincy    HISTORY OF PRESENT ILLNESS:     Oscar is a 74 y.o.   male who has a history of the below. C/o pain at almendarez site, otherwise no complaint at this time. I reviewed his chart in detail. We discussed the above.     Past Medical History:   Diagnosis Date    Hypertension       No past surgical history on file.  Social History     Tobacco Use    Smoking status: Every Day     Current packs/day: 0.50     Types: Cigarettes    Smokeless tobacco: Never   Substance Use Topics    Alcohol use: Yes      Family History   Problem Relation Age of Onset    No Known Problems Mother     No Known Problems Father       No Known Allergies   Prior to Admission medications    Medication Sig Start Date End Date Taking? Authorizing Provider   valsartan (DIOVAN) 160 MG tablet TAKE 1 TABLET BY MOUTH EVERY DAY  Patient not taking: Reported on 2024   Tanner Ojeda MD   amLODIPine (NORVASC) 10 MG tablet Take 1 tablet by mouth daily  Patient not taking: Reported on 2024   Automatic Reconciliation, Ar     REVIEW OF SYSTEMS:     []  Unable to obtain reliable ROS due to  [] 
 [facility]          I came to evaluate patient and he was being discharged with his family. Nurse said patient was discharged by hospitalist with outpatient follow up. Apparently had some tachycardia yesterday and has not had any since.   Please reach out to our office for further follow up or questions.   Thanks  Vivian HUNT-BC  
Palliative Medicine  Patient Name: Oscar Briceno  YOB: 1950  MRN: [id number]  Age: 74 y.o.  Gender: male    Date of Initial Consult: 5/9/2024  Date of Service: 5/9/2024  Time: 4:48 PM  Provider: NESTOR Cristobal NP  Hospital Day: 2  Admit Date: 5/8/2024  Referring Provider: Curtis Pena DO       Reasons for Consultation:  Goals of Care and Overwhelming Symptoms    HISTORY OF PRESENT ILLNESS (HPI):   Oscar Briceno is a 74 y.o. male with a past medical history of prostate cancer, HTN, who was admitted on 5/8/2024 from home with a diagnosis of prostate cancer. Pt presented to ED with c/o \"pain that would not shake itself.\"  Per ED notes, pt is poor historian, poor health literacy, difficulty expressing symptoms.  Has had persistent musculoskeletal pain with recent development of severe, midline, mid back pain that radiates to his abdomen, exacerbated with sitting/standing, some relief with lying supine/reclined position.  Pt with known prostate cancer, followed by , lost to follow-up, has not received any treatment for the past 2 years, has not been seen by PCP, nno longer takes any prescription medications, smokes \"just under 1 ppd.\"   CT abdomen pelvis demonstrating diffuse pulmonary and osseous metastatic disease process with enlarged prostate. Labs demonstrate: Sodium 131, potassium 3.8, BUN 64, creatinine 3.16 (previous 0.76 in 2022), calcium 15.3 (albumin 3.4), total bilirubin 0.7, ALT 14, AST 38, alkaline phosphatase 86, WBC 10.0, hemoglobin 11.4, platelets 89.     5/9: sitting up in bed, AAO.  Reports pain as tolerable, 5-7/10.      Psychosocial: lives alone, independent with ADLs/IADLs including driving    Zoe Keating (sister) [phone]   PALLIATIVE DIAGNOSES:    Back pain due to metastatic prostate cancer  L1 pathologic fracture  Hypercalcemia of malignancy  Nausea and vomiting   QUINCY  Thrombocytopenia   Urinary retention   Goals of care    ASSESSMENT AND 
  24 0810 -- -- -- (!) 109 -- 99 %   24 0727 (!) 140/77 98.8 °F (37.1 °C) Oral (!) 121 16 99 %     Temp (24hrs), Av.9 °F (36.6 °C), Min:97.3 °F (36.3 °C), Max:98.8 °F (37.1 °C)      Gen: Well-developed,  in no acute distress   HEENT: Pink conjunctivae, hearing intact to voice, moist mucous membranes   Neck: Supple  Resp: No respiratory distress   Card: RRR, palpable distal pulse-equal bilaterally, brisk cap refill all distal digits   Abd:non-distended  Musc: Patient sitting comfortably in the chair.  No pain to palpation of the low back or sacrum.  Active range of motion fully intact bilateral lower extremity.  Negative straight leg raise bilaterally.  No pain with passive range of motion of bilateral hips.  Skin: No skin breakdown noted. Skin warm, pink, dry  Neuro: Cranial nerves are grossly intact, no focal motor weakness, follows commands appropriately.  Sensation to light touch intact bilateral lower extremity  Psych: Good insight, oriented to person, place and time, alert    Imaging Review: INDICATION:. History of prostate cancer earlier imaging studies show disease  bilaterally - pt with pain in left pelvic and upper leg area.      EXAM: MR pelvis. Comparison prior day. Sequences include multiplanar T1 and T2  weighted images with and without fat saturation.     FINDINGS: There is diffuse marrow replacement of all visualized marrow elements.  There is extraosseous spread of tumor posterior left iliac wing with a dominant  lesion measuring 3.5 x 5.4 cm. There is superimposed edema in the left superior  pubic ramus, inferior pubic ramus and sacral ala concerning for insufficiency  type fracturing there are small nodes within the inguinal region bilaterally.     The prostate is enlarged. There is a contour irregularity of the posterior left  lateral prostate extending into the adjacent fat. This may in part be part of  the patient's primary tumor.     No acute or chronic muscular injury. 
lesion in the left iliac wing and left sacrum with destructive changes in the cortex and associated soft tissue tumor component.     Additional osteolytic lesion involving the right acetabulum associated with the soft tissue tumor component displacing obturator internus muscle.     There is 2.5 x 4 cm simple cyst upper pole of the left kidney medially. No renal obstruction.     Atherosclerotic changes in the aorta and the branches. There is no enlarged retroperitoneal lymphadenopathy. Appendix is normal.     Prostate is significantly enlarged.       Labs:    Recent Results (from the past 24 hour(s))   Basic Metabolic Panel    Collection Time: 05/08/24  9:38 PM   Result Value Ref Range    Sodium 133 (L) 136 - 145 mmol/L    Potassium 3.5 3.5 - 5.1 mmol/L    Chloride 100 97 - 108 mmol/L    CO2 22 21 - 32 mmol/L    Anion Gap 11 5 - 15 mmol/L    Glucose 84 65 - 100 mg/dL    BUN 63 (H) 6 - 20 MG/DL    Creatinine 3.00 (H) 0.70 - 1.30 MG/DL    Bun/Cre Ratio 21 (H) 12 - 20      Est, Glom Filt Rate 21 (L) >60 ml/min/1.73m2    Calcium 15.0 (HH) 8.5 - 10.1 MG/DL   Basic Metabolic Panel    Collection Time: 05/09/24  1:55 AM   Result Value Ref Range    Sodium 132 (L) 136 - 145 mmol/L    Potassium 3.3 (L) 3.5 - 5.1 mmol/L    Chloride 99 97 - 108 mmol/L    CO2 24 21 - 32 mmol/L    Anion Gap 9 5 - 15 mmol/L    Glucose 75 65 - 100 mg/dL    BUN 63 (H) 6 - 20 MG/DL    Creatinine 2.99 (H) 0.70 - 1.30 MG/DL    Bun/Cre Ratio 21 (H) 12 - 20      Est, Glom Filt Rate 21 (L) >60 ml/min/1.73m2    Calcium 14.7 (HH) 8.5 - 10.1 MG/DL   CBC with Auto Differential    Collection Time: 05/09/24  5:48 AM   Result Value Ref Range    WBC 7.7 4.1 - 11.1 K/uL    RBC 3.40 (L) 4.10 - 5.70 M/uL    Hemoglobin 9.9 (L) 12.1 - 17.0 g/dL    Hematocrit 30.1 (L) 36.6 - 50.3 %    MCV 88.5 80.0 - 99.0 FL    MCH 29.1 26.0 - 34.0 PG    MCHC 32.9 30.0 - 36.5 g/dL    RDW 14.6 (H) 11.5 - 14.5 %    Platelets 70 (L) 150 - 400 K/uL    MPV 11.2 8.9 - 12.9 FL    Nucleated RBCs

## 2024-05-15 ENCOUNTER — TELEPHONE (OUTPATIENT)
Age: 74
End: 2024-05-15

## 2024-05-16 ENCOUNTER — TELEPHONE (OUTPATIENT)
Age: 74
End: 2024-05-16

## 2024-05-16 NOTE — TELEPHONE ENCOUNTER
Ella with [employer] is calling to ask if Dr. Ojeda will follow them with seeing pt. Pt was d/c from hospital yesterday and [facility] [employer] was out today to see the pt.

## 2024-05-20 ENCOUNTER — TELEPHONE (OUTPATIENT)
Age: 74
End: 2024-05-20

## 2024-05-22 ENCOUNTER — HOSPITAL ENCOUNTER (OUTPATIENT)
Facility: HOSPITAL | Age: 74
Discharge: HOME | End: 2024-05-25
Attending: INTERNAL MEDICINE
Payer: MEDICARE

## 2024-05-22 DIAGNOSIS — R22.42 MASS OF LOWER LEG, LEFT: ICD-10-CM

## 2024-05-22 PROCEDURE — 93971 EXTREMITY STUDY: CPT

## 2024-06-05 ENCOUNTER — OFFICE VISIT (OUTPATIENT)
Age: 74
End: 2024-06-05
Payer: MEDICARE

## 2024-06-05 VITALS
OXYGEN SATURATION: 98 % | DIASTOLIC BLOOD PRESSURE: 78 MMHG | BODY MASS INDEX: 25.37 KG/M2 | RESPIRATION RATE: 16 BRPM | SYSTOLIC BLOOD PRESSURE: 135 MMHG | HEART RATE: 124 BPM | WEIGHT: 167.4 LBS | HEIGHT: 68 IN

## 2024-06-05 DIAGNOSIS — G89.3 PAIN DUE TO MALIGNANT NEOPLASM METASTATIC TO BONE (HCC): ICD-10-CM

## 2024-06-05 DIAGNOSIS — I10 ESSENTIAL HYPERTENSION: ICD-10-CM

## 2024-06-05 DIAGNOSIS — C79.51 PROSTATE CANCER METASTATIC TO BONE (HCC): Primary | ICD-10-CM

## 2024-06-05 DIAGNOSIS — C79.51 PAIN DUE TO MALIGNANT NEOPLASM METASTATIC TO BONE (HCC): ICD-10-CM

## 2024-06-05 DIAGNOSIS — C61 PROSTATE CANCER METASTATIC TO BONE (HCC): Primary | ICD-10-CM

## 2024-06-05 DIAGNOSIS — I82.452 ACUTE DEEP VEIN THROMBOSIS (DVT) OF LEFT PERONEAL VEIN (HCC): ICD-10-CM

## 2024-06-05 PROCEDURE — 1123F ACP DISCUSS/DSCN MKR DOCD: CPT | Performed by: FAMILY MEDICINE

## 2024-06-05 PROCEDURE — 3078F DIAST BP <80 MM HG: CPT | Performed by: FAMILY MEDICINE

## 2024-06-05 PROCEDURE — 3075F SYST BP GE 130 - 139MM HG: CPT | Performed by: FAMILY MEDICINE

## 2024-06-05 PROCEDURE — 99214 OFFICE O/P EST MOD 30 MIN: CPT | Performed by: FAMILY MEDICINE

## 2024-06-05 RX ORDER — APIXABAN 5 MG/1
5 TABLET, FILM COATED ORAL 2 TIMES DAILY
COMMUNITY
Start: 2024-05-24

## 2024-06-05 SDOH — ECONOMIC STABILITY: FOOD INSECURITY: WITHIN THE PAST 12 MONTHS, YOU WORRIED THAT YOUR FOOD WOULD RUN OUT BEFORE YOU GOT MONEY TO BUY MORE.: NEVER TRUE

## 2024-06-05 SDOH — ECONOMIC STABILITY: HOUSING INSECURITY
IN THE LAST 12 MONTHS, WAS THERE A TIME WHEN YOU DID NOT HAVE A STEADY PLACE TO SLEEP OR SLEPT IN A SHELTER (INCLUDING NOW)?: NO

## 2024-06-05 SDOH — ECONOMIC STABILITY: INCOME INSECURITY: HOW HARD IS IT FOR YOU TO PAY FOR THE VERY BASICS LIKE FOOD, HOUSING, MEDICAL CARE, AND HEATING?: NOT HARD AT ALL

## 2024-06-05 SDOH — ECONOMIC STABILITY: FOOD INSECURITY: WITHIN THE PAST 12 MONTHS, THE FOOD YOU BOUGHT JUST DIDN'T LAST AND YOU DIDN'T HAVE MONEY TO GET MORE.: NEVER TRUE

## 2024-06-05 ASSESSMENT — PATIENT HEALTH QUESTIONNAIRE - PHQ9
SUM OF ALL RESPONSES TO PHQ QUESTIONS 1-9: 0
1. LITTLE INTEREST OR PLEASURE IN DOING THINGS: NOT AT ALL
SUM OF ALL RESPONSES TO PHQ9 QUESTIONS 1 & 2: 0
2. FEELING DOWN, DEPRESSED OR HOPELESS: NOT AT ALL

## 2024-06-05 NOTE — PROGRESS NOTES
Chief Complaint   Patient presents with    Follow-Up from Hospital     Pt presented to the ER with severe pain on 5/8.     It has been more than 2 years since last visit with our office, pt also did not follow up with urology and stopped all medications.     Pt was found to have metastatic prostate cancer, evaluated by oncology.     Pt was also diagnosed with DVT on 5/22.     Pt reports that he has all of the medications he needs to take, not sure what he is actually taking, Pt is not sure what he wants to take.       Subjective: (As above and below)     Chief Complaint   Patient presents with    Follow-Up from Hospital     he is a 74 y.o. year old male who presents for evaluation.    Reviewed PmHx, RxHx, FmHx, SocHx, AllgHx and updated in chart.    Review of Systems - negative except as listed above    Objective:     Vitals:    06/05/24 1412   BP: 135/78   Site: Left Upper Arm   Position: Sitting   Cuff Size: Medium Adult   Pulse: (!) 124   Resp: 16   SpO2: 98%   Weight: 75.9 kg (167 lb 6.4 oz)   Height: 1.727 m (5' 8\")     Physical Examination: General appearance - alert, well appearing, and in no distress  Mental status - normal mood, behavior, speech, dress, motor activity, and thought processes  Ears - bilateral TM's and external ear canals normal  Chest - clear to auscultation, no wheezes, rales or rhonchi, symmetric air entry  Heart - normal rate, regular rhythm, normal S1, S2, no murmurs, rubs, clicks or gallops  Musculoskeletal - walks with cane  Extremities - edema bilaterally     Assessment/ Plan:   1. Prostate cancer metastatic to bone (HCC)  -being followed oncology, unclear if pt is taking chemo medication    2. Essential hypertension  -well controlled, continue on metoprolol    3. Pain due to malignant neoplasm metastatic to bone (HCC)  -currently well controlled, continue tylenol as needed    4. Acute deep vein thrombosis (DVT) of left lower extremity, unspecified vein (HCC)  -continue on eliquis

## 2024-06-05 NOTE — PROGRESS NOTES
Chief Complaint   Patient presents with    Follow-Up from Hospital     Pt has not been taking BP, thinks hospital are changing it     Health Maintenance Due   Topic Date Due    COVID-19 Vaccine (1) Never done    Pneumococcal 65+ years Vaccine (1 of 2 - PCV) Never done    Depression Screen  Never done    DTaP/Tdap/Td vaccine (1 - Tdap) Never done    Shingles vaccine (1 of 2) Never done    Respiratory Syncytial Virus (RSV) Pregnant or age 60 yrs+ (1 - 1-dose 60+ series) Never done    Annual Wellness Visit ([employer])  Never done         \"Have you been to the ER, urgent care clinic since your last visit?  Hospitalized since your last visit?\"    Yes, [facility]     “Have you seen or consulted any other health care providers outside of [facility] since your last visit?”    Yes, Dr. Kramer cancer doctor, Dr. Beher (cardiologist will be seeing), [facility] Dr. Muniz

## 2024-07-01 ENCOUNTER — HOSPITAL ENCOUNTER (EMERGENCY)
Facility: HOSPITAL | Age: 74
Discharge: HOME | End: 2024-07-01
Attending: EMERGENCY MEDICINE
Payer: MEDICARE

## 2024-07-01 VITALS
BODY MASS INDEX: 25.46 KG/M2 | SYSTOLIC BLOOD PRESSURE: 160 MMHG | DIASTOLIC BLOOD PRESSURE: 92 MMHG | RESPIRATION RATE: 23 BRPM | OXYGEN SATURATION: 96 % | TEMPERATURE: 98.5 F | HEART RATE: 73 BPM | WEIGHT: 168 LBS | HEIGHT: 68 IN

## 2024-07-01 DIAGNOSIS — E83.51 HYPOCALCEMIA: Primary | ICD-10-CM

## 2024-07-01 LAB
ALBUMIN SERPL-MCNC: 2.5 G/DL (ref 3.5–5)
ALBUMIN/GLOB SERPL: 0.5 (ref 1.1–2.2)
ALP SERPL-CCNC: 154 U/L (ref 45–117)
ALT SERPL-CCNC: 9 U/L (ref 12–78)
ANION GAP SERPL CALC-SCNC: 4 MMOL/L (ref 5–15)
AST SERPL-CCNC: 13 U/L (ref 15–37)
BASOPHILS # BLD: 0 K/UL (ref 0–0.1)
BASOPHILS NFR BLD: 0 % (ref 0–1)
BILIRUB SERPL-MCNC: 0.6 MG/DL (ref 0.2–1)
BUN SERPL-MCNC: 21 MG/DL (ref 6–20)
BUN/CREAT SERPL: 18 (ref 12–20)
CALCIUM SERPL-MCNC: 6.5 MG/DL (ref 8.5–10.1)
CHLORIDE SERPL-SCNC: 112 MMOL/L (ref 97–108)
CO2 SERPL-SCNC: 24 MMOL/L (ref 21–32)
CREAT SERPL-MCNC: 1.14 MG/DL (ref 0.7–1.3)
DIFFERENTIAL METHOD BLD: ABNORMAL
EOSINOPHIL # BLD: 0.2 K/UL (ref 0–0.4)
EOSINOPHIL NFR BLD: 3 % (ref 0–7)
ERYTHROCYTE [DISTWIDTH] IN BLOOD BY AUTOMATED COUNT: 18.1 % (ref 11.5–14.5)
GLOBULIN SER CALC-MCNC: 4.6 G/DL (ref 2–4)
GLUCOSE SERPL-MCNC: 87 MG/DL (ref 65–100)
HCT VFR BLD AUTO: 27 % (ref 36.6–50.3)
HGB BLD-MCNC: 8.2 G/DL (ref 12.1–17)
IMM GRANULOCYTES # BLD AUTO: 0.1 K/UL (ref 0–0.04)
IMM GRANULOCYTES NFR BLD AUTO: 1 % (ref 0–0.5)
LYMPHOCYTES # BLD: 2.2 K/UL (ref 0.8–3.5)
LYMPHOCYTES NFR BLD: 30 % (ref 12–49)
MAGNESIUM SERPL-MCNC: 1.7 MG/DL (ref 1.6–2.4)
MCH RBC QN AUTO: 29.3 PG (ref 26–34)
MCHC RBC AUTO-ENTMCNC: 30.4 G/DL (ref 30–36.5)
MCV RBC AUTO: 96.4 FL (ref 80–99)
MONOCYTES # BLD: 0.6 K/UL (ref 0–1)
MONOCYTES NFR BLD: 8 % (ref 5–13)
NEUTS SEG # BLD: 4.2 K/UL (ref 1.8–8)
NEUTS SEG NFR BLD: 58 % (ref 32–75)
NRBC # BLD: 0 K/UL (ref 0–0.01)
NRBC BLD-RTO: 0 PER 100 WBC
PLATELET # BLD AUTO: 238 K/UL (ref 150–400)
PMV BLD AUTO: 10.6 FL (ref 8.9–12.9)
POTASSIUM SERPL-SCNC: 4.3 MMOL/L (ref 3.5–5.1)
PROT SERPL-MCNC: 7.1 G/DL (ref 6.4–8.2)
RBC # BLD AUTO: 2.8 M/UL (ref 4.1–5.7)
SODIUM SERPL-SCNC: 140 MMOL/L (ref 136–145)
WBC # BLD AUTO: 7.2 K/UL (ref 4.1–11.1)

## 2024-07-01 PROCEDURE — 85025 COMPLETE CBC W/AUTO DIFF WBC: CPT

## 2024-07-01 PROCEDURE — 2500000003 HC RX 250 WO HCPCS: Performed by: EMERGENCY MEDICINE

## 2024-07-01 PROCEDURE — 83735 ASSAY OF MAGNESIUM: CPT

## 2024-07-01 PROCEDURE — 80053 COMPREHEN METABOLIC PANEL: CPT

## 2024-07-01 PROCEDURE — 36415 COLL VENOUS BLD VENIPUNCTURE: CPT

## 2024-07-01 PROCEDURE — 99284 EMERGENCY DEPT VISIT MOD MDM: CPT

## 2024-07-01 PROCEDURE — 96365 THER/PROPH/DIAG IV INF INIT: CPT

## 2024-07-01 RX ORDER — CALCIUM GLUCONATE 20 MG/ML
2000 INJECTION, SOLUTION INTRAVENOUS ONCE
Status: COMPLETED | OUTPATIENT
Start: 2024-07-01 | End: 2024-07-01

## 2024-07-01 RX ADMIN — CALCIUM GLUCONATE 2000 MG: 20 INJECTION, SOLUTION INTRAVENOUS at 19:13

## 2024-07-01 ASSESSMENT — LIFESTYLE VARIABLES
HOW MANY STANDARD DRINKS CONTAINING ALCOHOL DO YOU HAVE ON A TYPICAL DAY: PATIENT DOES NOT DRINK
HOW OFTEN DO YOU HAVE A DRINK CONTAINING ALCOHOL: NEVER

## 2024-07-01 ASSESSMENT — PAIN SCALES - GENERAL: PAINLEVEL_OUTOF10: 0

## 2024-07-01 NOTE — DISCHARGE INSTRUCTIONS
30.4 30.0 - 36.5 g/dL    RDW 18.1 (H) 11.5 - 14.5 %    Platelets 238 150 - 400 K/uL    MPV 10.6 8.9 - 12.9 FL    Nucleated RBCs 0.0 0  WBC    nRBC 0.00 0.00 - 0.01 K/uL    Neutrophils % 58 32 - 75 %    Lymphocytes % 30 12 - 49 %    Monocytes % 8 5 - 13 %    Eosinophils % 3 0 - 7 %    Basophils % 0 0 - 1 %    Immature Granulocytes % 1 (H) 0.0 - 0.5 %    Neutrophils Absolute 4.2 1.8 - 8.0 K/UL    Lymphocytes Absolute 2.2 0.8 - 3.5 K/UL    Monocytes Absolute 0.6 0.0 - 1.0 K/UL    Eosinophils Absolute 0.2 0.0 - 0.4 K/UL    Basophils Absolute 0.0 0.0 - 0.1 K/UL    Immature Granulocytes Absolute 0.1 (H) 0.00 - 0.04 K/UL    Differential Type AUTOMATED     Comprehensive Metabolic Panel w/ Reflex to MG    Collection Time: 07/01/24  4:05 PM   Result Value Ref Range    Sodium 140 136 - 145 mmol/L    Potassium 4.3 3.5 - 5.1 mmol/L    Chloride 112 (H) 97 - 108 mmol/L    CO2 24 21 - 32 mmol/L    Anion Gap 4 (L) 5 - 15 mmol/L    Glucose 87 65 - 100 mg/dL    BUN 21 (H) 6 - 20 MG/DL    Creatinine 1.14 0.70 - 1.30 MG/DL    BUN/Creatinine Ratio 18 12 - 20      Est, Glom Filt Rate 68 >60 ml/min/1.73m2    Calcium 6.5 (L) 8.5 - 10.1 MG/DL    Total Bilirubin 0.6 0.2 - 1.0 MG/DL    ALT 9 (L) 12 - 78 U/L    AST 13 (L) 15 - 37 U/L    Alk Phosphatase 154 (H) 45 - 117 U/L    Total Protein 7.1 6.4 - 8.2 g/dL    Albumin 2.5 (L) 3.5 - 5.0 g/dL    Globulin 4.6 (H) 2.0 - 4.0 g/dL    Albumin/Globulin Ratio 0.5 (L) 1.1 - 2.2     Magnesium    Collection Time: 07/01/24  4:05 PM   Result Value Ref Range    Magnesium 1.7 1.6 - 2.4 mg/dL     No orders to display       The exam and treatment you received in the Emergency Department were for an urgent problem and are not intended as complete care. It is important that you follow up with a doctor, nurse practitioner, or physician assistant for ongoing care. If your symptoms become worse or you do not improve as expected and you are unable to reach your usual health care provider, you should return to

## 2024-07-01 NOTE — ED PROVIDER NOTES
[facility] EMERGENCY DEPT  EMERGENCY DEPARTMENT ENCOUNTER       Pt Name: Oscar Briceno  MRN: [id number]  Birthdate 1950  Date of evaluation: 7/1/2024  Provider: Cristofer Leblanc MD   PCP: Tanner Ojeda MD  Note Started: 6:28 PM 7/1/24     CHIEF COMPLAINT       Chief Complaint   Patient presents with    Abnormal Ca level     Ambulatory to triage stating he was advised to present to ED after bloodwork at Dr. Guzman's office showed Ca of 5. Pt states he \"feels fine\" at this time.        HISTORY OF PRESENT ILLNESS: 1 or more elements      History From: Patient, History limited by: None     Oscar Briceno is a 74 y.o. male with a history of hypercalcemia of malignancy, now hypocalcemia who presents with outpatient labs demonstrating hypocalcemia.  Patient reports he feels well at this time, denies fatigue, muscle spasms, weakness, palpitations.  He is currently taking calcium supplementation, 600 mg twice daily, is not currently taking any medications for hypercalcemia for which we was admitted to the hospital in May.     Nursing Notes were all reviewed and agreed with or any disagreements were addressed in the HPI.     REVIEW OF SYSTEMS        Positives and Pertinent negatives as per HPI.    PAST HISTORY     Past Medical History:  Past Medical History:   Diagnosis Date    Hypertension        Past Surgical History:  No past surgical history on file.    Family History:  Family History   Problem Relation Age of Onset    No Known Problems Mother     No Known Problems Father        Social History:  Social History     Tobacco Use    Smoking status: Every Day     Current packs/day: 0.50     Types: Cigarettes    Smokeless tobacco: Never   Substance Use Topics    Alcohol use: Yes    Drug use: Never       Allergies:  No Known Allergies    CURRENT MEDICATIONS      Previous Medications    ACETAMINOPHEN (AMINOFEN) 325 MG TABLET    Take 2 tablets by mouth every 6 hours as needed for Pain    BICALUTAMIDE (CASODEX) 50 MG CHEMO

## 2024-07-02 NOTE — ED NOTES
Patient discharged from the ED by Benji ESCOBEDO. Diagnosis, medications, precautions and follow-ups were reviewed with the patient/family. Questions were asked and answered prior to departure. Patient departed the ED via wheelchair and was accompanied by spouse.

## 2024-07-05 LAB
EKG ATRIAL RATE: 90 BPM
EKG DIAGNOSIS: NORMAL
EKG P AXIS: 45 DEGREES
EKG P-R INTERVAL: 170 MS
EKG Q-T INTERVAL: 440 MS
EKG QRS DURATION: 96 MS
EKG QTC CALCULATION (BAZETT): 538 MS
EKG R AXIS: 31 DEGREES
EKG T AXIS: 42 DEGREES
EKG VENTRICULAR RATE: 90 BPM

## 2024-07-08 ENCOUNTER — TELEPHONE (OUTPATIENT)
Age: 74
End: 2024-07-08

## 2024-07-08 NOTE — TELEPHONE ENCOUNTER
Pt is calling to inform his Urologist at [facility] told him to go to the ED for a intravenous calcium infusion.    Pt was told his calcium was very low     Pt goes back to his urologist either 07/11 or 07/12. Pt stated he will call this office back to set up a follow up apt    Pt was seen at [facility]

## 2024-09-23 ENCOUNTER — HOSPITAL ENCOUNTER (OUTPATIENT)
Facility: HOSPITAL | Age: 74
Discharge: HOME | End: 2024-09-26
Attending: INTERNAL MEDICINE
Payer: MEDICARE

## 2024-09-23 DIAGNOSIS — C61 MALIGNANT NEOPLASM OF PROSTATE (HCC): ICD-10-CM

## 2024-09-23 DIAGNOSIS — R22.42 MASS OF LOWER LEG, LEFT: ICD-10-CM

## 2024-09-23 DIAGNOSIS — I82.452 THROMBOSIS OF LEFT PERONEAL VEIN (HCC): ICD-10-CM

## 2024-09-23 DIAGNOSIS — E83.52 HYPERCALCEMIA: ICD-10-CM

## 2024-09-23 DIAGNOSIS — E87.6 HYPOPOTASSEMIA: ICD-10-CM

## 2024-09-23 PROCEDURE — 71260 CT THORAX DX C+: CPT

## 2024-09-23 PROCEDURE — 6360000004 HC RX CONTRAST MEDICATION: Performed by: INTERNAL MEDICINE

## 2024-09-23 RX ORDER — IOPAMIDOL 755 MG/ML
100 INJECTION, SOLUTION INTRAVASCULAR
Status: COMPLETED | OUTPATIENT
Start: 2024-09-23 | End: 2024-09-23

## 2024-09-23 RX ADMIN — IOPAMIDOL 100 ML: 755 INJECTION, SOLUTION INTRAVENOUS at 09:38

## 2024-12-01 RX ORDER — METOPROLOL TARTRATE 25 MG/1
TABLET, FILM COATED ORAL
Qty: 90 TABLET | Refills: 1 | Status: SHIPPED | OUTPATIENT
Start: 2024-12-01

## 2024-12-04 ENCOUNTER — TRANSCRIBE ORDERS (OUTPATIENT)
Facility: HOSPITAL | Age: 74
End: 2024-12-04

## 2024-12-04 DIAGNOSIS — C61 PROSTATE CA (HCC): Primary | ICD-10-CM

## 2025-01-15 ENCOUNTER — HOSPITAL ENCOUNTER (OUTPATIENT)
Facility: HOSPITAL | Age: 75
Discharge: HOME | End: 2025-01-18
Attending: UROLOGY
Payer: MEDICARE

## 2025-01-15 DIAGNOSIS — C61 PROSTATE CA (HCC): ICD-10-CM

## 2025-01-15 LAB — CREAT BLD-MCNC: 1 MG/DL (ref 0.6–1.3)

## 2025-01-15 PROCEDURE — 6360000004 HC RX CONTRAST MEDICATION

## 2025-01-15 PROCEDURE — 82565 ASSAY OF CREATININE: CPT

## 2025-01-15 PROCEDURE — 74177 CT ABD & PELVIS W/CONTRAST: CPT

## 2025-01-15 RX ORDER — IOPAMIDOL 755 MG/ML
INJECTION, SOLUTION INTRAVASCULAR
Status: COMPLETED
Start: 2025-01-15 | End: 2025-01-15

## 2025-01-15 RX ADMIN — IOPAMIDOL 100 ML: 755 INJECTION, SOLUTION INTRAVENOUS at 12:15

## 2025-01-31 ENCOUNTER — HOSPITAL ENCOUNTER (OUTPATIENT)
Facility: HOSPITAL | Age: 75
End: 2025-01-31
Payer: MEDICARE

## 2025-01-31 VITALS
SYSTOLIC BLOOD PRESSURE: 150 MMHG | HEART RATE: 74 BPM | BODY MASS INDEX: 25.91 KG/M2 | WEIGHT: 171 LBS | HEIGHT: 68 IN | DIASTOLIC BLOOD PRESSURE: 87 MMHG | RESPIRATION RATE: 18 BRPM

## 2025-01-31 DIAGNOSIS — C61 PROSTATE CANCER METASTATIC TO BONE (HCC): Primary | ICD-10-CM

## 2025-01-31 DIAGNOSIS — C79.51 PROSTATE CANCER METASTATIC TO BONE (HCC): Primary | ICD-10-CM

## 2025-01-31 PROCEDURE — 99203 OFFICE O/P NEW LOW 30 MIN: CPT

## 2025-01-31 RX ORDER — ENZALUTAMIDE 40 MG/1
40 TABLET ORAL DAILY
COMMUNITY

## 2025-01-31 ASSESSMENT — PAIN SCALES - GENERAL: PAINLEVEL_OUTOF10: 0

## 2025-01-31 NOTE — CONSULTS
[facility] ONCOLOGY NEW PATIENT CONSULT NOTE    Patient Name: Oscar Briceno  Patient YOB: 1950   Medical Record Number: [id number]  Referring Physician: Arleen Muniz MD  [address] [facility]  [address] [place], [place] [id number]  Primary Care Provider: Tanner Ojeda MD    DIAGNOSIS & STAGING:  Cancer Staging   No matching staging information was found for the patient.      ICD-10-CM    1. 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   He presented in 2020 with DVT, and a PSA of 6959 ng/mL on 6/30/2020.   Nuclear bone scan 7/21/2020 demonstrated extensive bony metastasis. CT a/p that day demonstrated enlarged prostate extending into the penile bulb, pelvic adenopathy, and metastatic disease throughout the skeleton. I reviewed this CT, and also the CT chest 6/22/2020 which in retrospect probably shows evidence of bony metastasis as well.   He was diagnosed with prostate cancer by needle biopsy. His biopsy was 7/22/2020 with Dr Wang. The prostate was 153 cc by TRUS. Pathology revealed cancer in 2/2 cores, with [place] 4 + 5 in both positive cores. He started ADT 7/30/2020 and later abiraterone was added.   PSA was 7.5 ng/mL on 4/28/2022, with testosterone < 2.5 ng/mL.   The patient stopped following with urology and medical oncology in 2022.   On 6/10/2024 his PSA was 4751 ng/mL.   He had presented with back pain radiating to lower extremity at [facility] in March 2024 and imaging there showed metastatic disease.   He again presented with pain, urinary retention, and hypercalcemia via [facility] ED 5/8/24. Imaging  re-demonstrated widespread bony metastatic disease, as well as innumerable small bilateral pulmonary nodules, likely metastases.   The patient re-started ADT and later enzalutamide was added.   On 1/13/2025 his PSA was 24.6 ng/mL, with testosterone = 9.0 ng/dL.   CT a/p demonstrated extensive osseous metastaes. I reviewed this CT personally and agree with the radiology report.   He was accompanied to his consult by his sister. His urinary symptoms are notable for having a Diana catheter in place. MARINA = 1.   I discussed his case by telephone with Dr Guzman.          PHYSICAL EXAM:     Vital Signs for this encounter:  BSA: 1.93 meters squared  BP (!) 150/87   Pulse 74   Resp 18   Ht 1.727 m (5' 8\")   Wt 77.6 kg (171 lb)   BMI 26.00 kg/m²   Mr. Briceno has elevated blood pressure today. Due to the elevated BP reading today, the following recommendations have been made: referred to Alternative/PCP.   Performance status: ECOG 1, No physically strenuous activity, but ambulatory and able to carry out light or sedentary work (eg office work, light house work)  Constitutional: No evidence of impaired alertness, uncooperativeness, developmental delays, altered mood or affect, or disorientation.  Head: Normocephalic, atraumatic  Eyes: No evidence of conjunctivitis or scleral abnormalities.  Mouth: Poor dentition, upper dentures in place.   Skin: No evidence of blistering or rash.  Cardiovascular: No evidence of abnormal heart rate.  Respiratory: Breathing unlabored on room air without accessory muscle use.  Genitourinary: Prostate exam per urology.  Musculoskeletal: Normal muscle bulk. No deformity.  Neurologic: Grossly intact. Walks with cane.   Psychiatric: Appropriate mood and affect. Good judgment and insight.  Hematologic/Lymphatic: No evidence of petechiae / purpura / ecchymosis.      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

## 2025-02-13 ENCOUNTER — HOSPITAL ENCOUNTER (OUTPATIENT)
Facility: HOSPITAL | Age: 75
Discharge: HOME | End: 2025-02-16
Attending: INTERNAL MEDICINE
Payer: MEDICARE

## 2025-02-13 DIAGNOSIS — R91.8 LUNG MASS: ICD-10-CM

## 2025-02-13 PROCEDURE — 71250 CT THORAX DX C-: CPT

## 2025-03-07 ENCOUNTER — HOSPITAL ENCOUNTER (OUTPATIENT)
Facility: HOSPITAL | Age: 75
Discharge: HOME | End: 2025-03-10
Attending: UROLOGY
Payer: MEDICARE

## 2025-03-07 DIAGNOSIS — Z79.818 ANDROGEN DEPRIVATION THERAPY: ICD-10-CM

## 2025-03-07 PROCEDURE — 77080 DXA BONE DENSITY AXIAL: CPT

## 2025-05-16 ENCOUNTER — COMMUNITY OUTREACH (OUTPATIENT)
Age: 75
End: 2025-05-16